# Patient Record
Sex: MALE | Race: WHITE | Employment: PART TIME | URBAN - NONMETROPOLITAN AREA
[De-identification: names, ages, dates, MRNs, and addresses within clinical notes are randomized per-mention and may not be internally consistent; named-entity substitution may affect disease eponyms.]

---

## 2019-12-10 ENCOUNTER — HOSPITAL ENCOUNTER (OUTPATIENT)
Age: 74
Discharge: HOME | End: 2019-12-10
Payer: MEDICARE

## 2019-12-10 ENCOUNTER — HOSPITAL ENCOUNTER (OUTPATIENT)
Dept: GENERAL RADIOLOGY | Age: 74
Discharge: HOME | End: 2019-12-10
Payer: MEDICARE

## 2019-12-10 DIAGNOSIS — M54.50 LOW BACK PAIN, UNSPECIFIED BACK PAIN LATERALITY, UNSPECIFIED CHRONICITY, UNSPECIFIED WHETHER SCIATICA PRESENT: ICD-10-CM

## 2019-12-10 PROCEDURE — 72100 X-RAY EXAM L-S SPINE 2/3 VWS: CPT

## 2020-01-07 ENCOUNTER — HOSPITAL ENCOUNTER (OUTPATIENT)
Dept: MRI IMAGING | Age: 75
Discharge: HOME | End: 2020-01-07
Payer: MEDICARE

## 2020-01-07 PROCEDURE — 72148 MRI LUMBAR SPINE W/O DYE: CPT

## 2022-05-23 ENCOUNTER — TELEPHONE (OUTPATIENT)
Dept: ENT CLINIC | Age: 77
End: 2022-05-23

## 2022-05-23 NOTE — TELEPHONE ENCOUNTER
Called and left a voicemail to schedule a consultation for the patient. We received a referral from the [place]. I have yet to enter the referral due to the referral team.   Asked for a call back so we can schedule together.

## 2022-06-23 ENCOUNTER — OFFICE VISIT (OUTPATIENT)
Dept: ENT CLINIC | Age: 77
End: 2022-06-23
Payer: OTHER GOVERNMENT

## 2022-06-23 VITALS
WEIGHT: 179.9 LBS | HEIGHT: 71 IN | TEMPERATURE: 97.5 F | SYSTOLIC BLOOD PRESSURE: 128 MMHG | OXYGEN SATURATION: 98 % | DIASTOLIC BLOOD PRESSURE: 70 MMHG | BODY MASS INDEX: 25.19 KG/M2 | HEART RATE: 61 BPM

## 2022-06-23 DIAGNOSIS — R04.0 EPISTAXIS: ICD-10-CM

## 2022-06-23 DIAGNOSIS — T88.7XXA SIDE EFFECT OF MEDICATION: ICD-10-CM

## 2022-06-23 DIAGNOSIS — J34.2 DEVIATED NASAL SEPTUM: Primary | ICD-10-CM

## 2022-06-23 DIAGNOSIS — R09.81 STUFFY NOSE: ICD-10-CM

## 2022-06-23 PROCEDURE — 99204 OFFICE O/P NEW MOD 45 MIN: CPT | Performed by: OTOLARYNGOLOGY

## 2022-06-23 PROCEDURE — 1123F ACP DISCUSS/DSCN MKR DOCD: CPT | Performed by: OTOLARYNGOLOGY

## 2022-06-23 RX ORDER — IPRATROPIUM BROMIDE 42 UG/1
1 SPRAY, METERED NASAL 3 TIMES DAILY
Qty: 15 ML | Refills: 3 | Status: SHIPPED | OUTPATIENT
Start: 2022-06-23

## 2022-06-23 RX ORDER — RAMIPRIL 2.5 MG/1
2.5 CAPSULE ORAL DAILY
COMMUNITY

## 2022-06-23 ASSESSMENT — ENCOUNTER SYMPTOMS
SINUS PRESSURE: 0
CHEST TIGHTNESS: 0
COLOR CHANGE: 0
COUGH: 0
VOICE CHANGE: 0
DIARRHEA: 0
SORE THROAT: 0
SHORTNESS OF BREATH: 0
FACIAL SWELLING: 0
TROUBLE SWALLOWING: 0
APNEA: 0
STRIDOR: 0
CHOKING: 0
RHINORRHEA: 1
ABDOMINAL PAIN: 0
NAUSEA: 0
VOMITING: 0
WHEEZING: 0

## 2022-06-23 NOTE — PROGRESS NOTES
[facility] [place] [facility]  [facility] EAR, NOSE AND THROAT  [facility]  Dept: [phone]  Dept Fax: [phone]  Loc: [address] [facility] KATHERINE Case is a 68 y.o. male who was referred byNo ref. provider found for:  Chief Complaint   Patient presents with    New Patient     New patient is here Epistaxis and sinus problems. He has had nose broke a few times and reset 1. He said the the drainage is clear. His lost nosebleedwas about a month ago.  Epistaxis   . HPI:     Marivel Pires Case is a 68 y.o. male who presents today for evaluation of epistaxis and sinus problems. Last nosebleed was a month ago. Broke nose a few times and reset it once. States drainage is clear. Bleeding from right side, nose broke several times, had reset once. 15 years ago a doctor in [facility] put a balloon sinu-plasty it was fine after then gradually he started getting bothered with rouble Breathing at night, uses breathe right nasal strips. Takes tamasolosin which can make nose stuffy. Last nosebleed was a month ago with a head cold, nosebleeds when has head colds had 3 nosebleeds in the last year. Takes Afrin only when he gets a head cold, doesn't use daily. Doesn't use Vicks, no menthol, no cough drops. No throat issues. No Q-tip use, no ear buds. Saw Dr. Ivan Thornton for his Urologist, he is established with a new doctor at [facility].      History:     No Known Allergies  Current Outpatient Medications   Medication Sig Dispense Refill    ramipril (ALTACE) 2.5 MG capsule Take 2.5 mg by mouth daily      OXYBUTYNIN TD Place 2.5 mg of ampicillin onto the skin Once a day      ipratropium (ATROVENT) 0.06 % nasal spray 1 spray by Nasal route 3 times daily Spray at bedtime and may repeat every 6 hours. No more than 3 doses a day.  15 mL 3    Probiotic Product (PROBIOTIC DAILY PO) Take 1 capsule by mouth daily      cetirizine (ZYRTEC ALLERGY) 10 MG tablet Take 10 mg by mouth daily      acetaminophen (TYLENOL) 500 MG tablet Take 500 mg by mouth every 6 hours as needed for Pain      tamsulosin (FLOMAX) 0.4 MG capsule Take 0.4 mg by mouth daily      Glucosamine-Fish Oil-EPA-DHA (GLUCOSAMINE & FISH OIL PO) Take by mouth       No current facility-administered medications for this visit. Past Medical History:   Diagnosis Date    BPH (benign prostatic hyperplasia)     OA (osteoarthritis) of hip     left    Prostate cancer ([facility] [address] 2003    Dr. [facility]ousa [facility] - s/p radiation beads/radiation/hormone therapy    Sinus bradycardia     HR 49 on preop EKG - no syncope or lightheadedness - he exercises regularly    Snoring     no known apnea, no daytime somnolence, no am headache, no waking coughing or choking, BMI 24.5, neck circ 15.5 inches      Past Surgical History:   Procedure Laterality Date    PROSTATE SURGERY  6-2003    Prostate CA seed implants    ROTATOR CUFF REPAIR Right     SEPTOPLASTY      SINUS SURGERY  1980    TONSILLECTOMY  1975     Family History   Problem Relation Age of Onset    Breast Cancer Mother     Heart Attack Father 80     Social History     Tobacco Use    Smoking status: Never Smoker    Smokeless tobacco: Former User   Substance Use Topics    Alcohol use: Yes     Comment: Socially       Subjective:       Review of Systems   Constitutional: Negative for activity change, appetite change, chills, diaphoresis, fatigue, fever and unexpected weight change. HENT: Positive for congestion, dental problem, postnasal drip, rhinorrhea and sneezing. Negative for ear discharge, ear pain, facial swelling, hearing loss, mouth sores, nosebleeds, sinus pressure, sore throat, tinnitus, trouble swallowing and voice change. Eyes: Negative for visual disturbance. Respiratory: Negative for apnea, cough, choking, chest tightness, shortness of breath, wheezing and stridor.     Cardiovascular: Negative for chest pain, palpitations and leg swelling. Gastrointestinal: Negative for abdominal pain, diarrhea, nausea and vomiting. Endocrine: Negative for cold intolerance, heat intolerance, polydipsia and polyuria. Genitourinary: Negative for dysuria, enuresis and hematuria. Musculoskeletal: Negative for arthralgias, gait problem, neck pain and neck stiffness. Skin: Negative for color change and rash. Allergic/Immunologic: Negative for environmental allergies, food allergies and immunocompromised state. Neurological: Negative for dizziness, syncope, facial asymmetry, speech difficulty, light-headedness and headaches. Hematological: Negative for adenopathy. Bruises/bleeds easily. Psychiatric/Behavioral: Negative for confusion and sleep disturbance. The patient is not nervous/anxious. Objective:     /70 (Site: Right Upper Arm, Position: Sitting)   Pulse 61   Temp 97.5 °F (36.4 °C) (Infrared)   Ht 5' 11\" (1.803 m)   Wt 179 lb 14.4 oz (81.6 kg)   SpO2 98%   BMI 25.09 kg/m²     Physical Exam  Vitals and nursing note reviewed. Constitutional:       Appearance: He is well-developed. HENT:      Head: Normocephalic and atraumatic. No laceration. Salivary Glands: Right salivary gland is not diffusely enlarged or tender. Left salivary gland is not diffusely enlarged or tender. Comments:        Right Ear: Hearing, tympanic membrane, ear canal and external ear normal. No drainage or swelling. No middle ear effusion. Tympanic membrane is not perforated or erythematous. Left Ear: Hearing, tympanic membrane, ear canal and external ear normal. No drainage or swelling. No middle ear effusion. Tympanic membrane is not perforated or erythematous. Nose: Septal deviation (deviated nasal septum, bows to right up high, mucosa little erythematous) present. No mucosal edema or rhinorrhea. Mouth/Throat:      Mouth: Mucous membranes are moist. Mucous membranes are not pale and not dry. No oral lesions.       Tongue: No lesions. Pharynx: Oropharynx is clear. Uvula midline. No oropharyngeal exudate or posterior oropharyngeal erythema. Comments: LIps: lips normal     Mallampati 1  Base of tongue: symmetric  Mirror exam deferred due to gag reflex. Eyes:      Extraocular Movements: Extraocular movements intact. Comments: Conjugate gaze   Neck:      Thyroid: No thyroid mass or thyromegaly. Trachea: Phonation normal. No tracheal deviation. Comments:     Pulmonary:      Effort: Pulmonary effort is normal. No retractions. Breath sounds: No stridor. Musculoskeletal:      Cervical back: Normal range of motion and neck supple. Lymphadenopathy:      Cervical: No cervical adenopathy. Neurological:      Mental Status: He is alert and oriented to person, place, and time. Cranial Nerves: Cranial nerves are intact. Cranial nerve deficit: VIIth N function intact bilat. Psychiatric:         Mood and Affect: Mood and affect normal.         Behavior: Behavior is cooperative. Data:  All of the past medical history, past surgical history, family history,social history, allergies and current medications were reviewed with the patient. Assessment & Plan   Diagnoses and all orders for this visit:     Diagnosis Orders   1. Stuffy nose  ipratropium (ATROVENT) 0.06 % nasal spray   2. Epistaxis     3. Deviated nasal septum     4. Side effect of medication  ipratropium (ATROVENT) 0.06 % nasal spray    (tamsulosin)       The findings were explained and his questions were answered. Chief complaint is stuffy nose especially at night and he takes tamsulosin, which causes turbinates to swell. Options were discussed including prescribing Atrovent. 1 month follow up, discuss with Urologist regarding best time of day to take Tamsulosin. He agreed. Follow up 1 month       Tiago HOLM CMA ([facility]), am scribing for, and in the presence of Dr. Mendel Antony.  Electronically signed by Rajinder Wong ([facility]) on 6/23/22 at 10:45 AM EDT. (Please note that portions of this note were completed with a voice recognition program. Efforts were made to edit the dictations butoccasionally words are mis-transcribed.)    I agree to the above documentation placed by my scribe. I have personally evaluated this patient. Additional findings are as noted. I reviewed the scribe's note and agree with the documented findings and plan of care. Any areas of disagreement are corrected. I agree with the chief complaint, past medical history, past surgical history, allergies, medications, social and family history as documented unless otherwise noted below.      Electronically signed by Evie Chandra MD on 7/4/2022 at 2:09 PM

## 2022-06-23 NOTE — PATIENT INSTRUCTIONS
Discuss with Urologist regarding what time of day to take Tamsulosin. Will prescribe Atrovent. Take the Atrovent at bedtime on the right side or both sides. May repeat through the night if needed.   Might have to change the timing of your tamsulosin dose

## 2022-07-21 ENCOUNTER — PREP FOR PROCEDURE (OUTPATIENT)
Dept: ENT CLINIC | Age: 77
End: 2022-07-21

## 2022-07-21 ENCOUNTER — OFFICE VISIT (OUTPATIENT)
Dept: ENT CLINIC | Age: 77
End: 2022-07-21
Payer: MEDICARE

## 2022-07-21 VITALS
HEIGHT: 71 IN | HEART RATE: 53 BPM | WEIGHT: 181.1 LBS | RESPIRATION RATE: 14 BRPM | DIASTOLIC BLOOD PRESSURE: 56 MMHG | BODY MASS INDEX: 25.35 KG/M2 | SYSTOLIC BLOOD PRESSURE: 116 MMHG | OXYGEN SATURATION: 97 % | TEMPERATURE: 97.2 F

## 2022-07-21 DIAGNOSIS — R09.81 STUFFY NOSE: ICD-10-CM

## 2022-07-21 DIAGNOSIS — Z01.818 PRE-OP TESTING: Primary | ICD-10-CM

## 2022-07-21 DIAGNOSIS — J34.2 DEVIATED NASAL SEPTUM: Primary | ICD-10-CM

## 2022-07-21 DIAGNOSIS — M95.0: ICD-10-CM

## 2022-07-21 DIAGNOSIS — J34.3 HYPERTROPHY OF BOTH INFERIOR NASAL TURBINATES: ICD-10-CM

## 2022-07-21 DIAGNOSIS — R09.81 CHRONIC NASAL CONGESTION: ICD-10-CM

## 2022-07-21 PROCEDURE — G8420 CALC BMI NORM PARAMETERS: HCPCS | Performed by: OTOLARYNGOLOGY

## 2022-07-21 PROCEDURE — G8427 DOCREV CUR MEDS BY ELIG CLIN: HCPCS | Performed by: OTOLARYNGOLOGY

## 2022-07-21 PROCEDURE — 1036F TOBACCO NON-USER: CPT | Performed by: OTOLARYNGOLOGY

## 2022-07-21 PROCEDURE — 1123F ACP DISCUSS/DSCN MKR DOCD: CPT | Performed by: OTOLARYNGOLOGY

## 2022-07-21 PROCEDURE — 99214 OFFICE O/P EST MOD 30 MIN: CPT | Performed by: OTOLARYNGOLOGY

## 2022-07-21 NOTE — PROGRESS NOTES
[facility] [place] [facility]  [facility] EAR, NOSE AND THROAT  [facility]  Dept: [phone]  Dept Fax: [phone]  Loc: [address] [facility] KATHERINE Case is a 68 y.o. male who was referred byNo ref. provider found for:  Chief Complaint   Patient presents with    Follow-up     Patient is here for 1 mo f/u deviated nasal septum     . HPI:     Kathy Keith Case is a 68 y.o. male who presents today for 1 month follow up deviated nasal septum. States the spray helped. States nose is still restricted. Patient uses the breathe right nasal strips at night. 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 He tried taking tamsulosin in the mornings and now he's going to see if he can take it every third morning. Has a dry mouth when he wakes up. Takes ramipril, fish oil. He's been using a menthol vapor nasal inhaler for 2 weeks. History:     No Known Allergies  Current Outpatient Medications   Medication Sig Dispense Refill    ramipril (ALTACE) 2.5 MG capsule Take 2.5 mg by mouth daily      ipratropium (ATROVENT) 0.06 % nasal spray 1 spray by Nasal route 3 times daily Spray at bedtime and may repeat every 6 hours. No more than 3 doses a day. 15 mL 3    tamsulosin (FLOMAX) 0.4 MG capsule Take 0.4 mg by mouth daily      Probiotic Product (PROBIOTIC DAILY PO) Take 1 capsule by mouth daily      cetirizine (ZYRTEC) 10 MG tablet Take 10 mg by mouth daily      acetaminophen (TYLENOL) 500 MG tablet Take 500 mg by mouth every 6 hours as needed for Pain      Glucosamine-Fish Oil-EPA-DHA (GLUCOSAMINE & FISH OIL PO) Take by mouth      diphenhydrAMINE (BENADRYL) 12.5 MG chewable tablet Take 12.5 mg by mouth nightly as needed for Allergies       No current facility-administered medications for this visit.      Past Medical History:   Diagnosis Date    BPH (benign prostatic hyperplasia)     OA (osteoarthritis) of hip     left    Prostate cancer ([facility] [address] 01/01/2003 Dr. Sousa [facility] - s/p radiation beads/radiation/hormone therapy    Sinus bradycardia     HR 49 on preop EKG - no syncope or lightheadedness - he exercises regularly    Snoring     no known apnea, no daytime somnolence, no am headache, no waking coughing or choking, BMI 24.5, neck circ 15.5 inches      Past Surgical History:   Procedure Laterality Date    KNEE ARTHROSCOPY W/ MENISCAL REPAIR Left 2022    PROSTATE SURGERY  06/01/2003    Prostate CA seed implants    ROTATOR CUFF REPAIR Right 10/01/2012    SEPTOPLASTY  11/01/1977    SINUS SURGERY  01/01/1980    TONSILLECTOMY  01/01/1975     Family History   Problem Relation Age of Onset    Breast Cancer Mother     Heart Attack Father 80     Social History     Tobacco Use    Smoking status: Never    Smokeless tobacco: Former    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 Constitutional:       Appearance: He is well-developed. HENT:      Head: Normocephalic and atraumatic. No laceration. Salivary Glands: Right salivary gland is not diffusely enlarged or tender. Left salivary gland is not diffusely enlarged or tender. Comments:        Right Ear: Hearing, tympanic membrane, ear canal and external ear normal. No drainage or swelling. No middle ear effusion. Tympanic membrane is not perforated or erythematous. Left Ear: Hearing, tympanic membrane, ear canal and external ear normal. No drainage or swelling. No middle ear effusion. Tympanic membrane is not perforated or erythematous.       Nose: Nasal deformity and septal deviation (deviated nasal septum, bows to right up high, mucosa little erythematous) present. No mucosal edema or rhinorrhea. Comments: He has a large nose externally, which droops significantly and contributes to much of his nasal obstruction. Mouth/Throat:      Mouth: Mucous membranes are moist. Mucous membranes are not pale and not dry. No oral lesions. Tongue: No lesions. Pharynx: Oropharynx is clear. Uvula midline. No oropharyngeal exudate or posterior oropharyngeal erythema. Comments: LIps: lips normal     Mallampati 1  Base of tongue: symmetric  Mirror exam deferred due to gag reflex. Eyes:      Extraocular Movements: Extraocular movements intact. Comments: Conjugate gaze   Neck:      Thyroid: No thyroid mass or thyromegaly. Trachea: Phonation normal. No tracheal deviation. Comments:     Cardiovascular:      Rate and Rhythm: Normal rate and regular rhythm. Heart sounds: No murmur heard. Pulmonary:      Effort: Pulmonary effort is normal. No retractions. Breath sounds: Normal breath sounds. No stridor. Chest:      Chest wall: There is no dullness to percussion. Musculoskeletal:      Cervical back: Normal range of motion and neck supple. Lymphadenopathy:      Cervical: No cervical adenopathy. Neurological:      Mental Status: He is alert and oriented to person, place, and time. Cranial Nerves: Cranial nerves are intact. Cranial nerve deficit: VIIth N function intact bilat. Psychiatric:         Mood and Affect: Mood and affect normal.         Behavior: Behavior is cooperative. Data:  All of the past medical history, past surgical history, family history,social history, allergies and current medications were reviewed with the patient. Assessment & Plan   Diagnoses and all orders for this visit:     Diagnosis Orders   1.  Deviated nasal septum  IN REPAIR OF NASAL SEPTUM      2. Stuffy nose  IN REPAIR OF NASAL SEPTUM Miscellaneous Surgery      3. Hypertrophy of both inferior nasal turbinates  Miscellaneous Surgery      4. Chronic nasal congestion  NC REPAIR OF NASAL SEPTUM    Miscellaneous Surgery    Worse at night      5. Overhanging nasal tip  NC REPAIR OF NASAL SEPTUM    Simple elevation of the tip helps nasal airway significantly          The findings were explained and his questions were answered. Opitons were discussed including septoplasty to clear airway and to lift up the tip of his nose. Surgery would be 1 and a half hour to 2 hours estimated. He agreed. Informed consent: Septoplasty complications that may occur were discussed including postoperative bleeding (usually easy to control), infection, nasal crusting, a hole in the septum (perforation), failure to completely improve breathing, persistent septal deflection resulting in the need for revision (uncommon), and very rarely, a change in appearance. They understand that no guarantees are made regarding either outcome or potential complications. They read the patient information sheet and were given a copy to take with them. All of their questions were answered. They requested we proceed. Benefits and risks are discussed, along with alternatives, and their questions were answered. No guarantees were made. They request we proceed. Prescription medications to be held:  Ramipril for 2 days, before surgery and two weeks afterwards. Tamsulosin for a week before and a week after surgery       ICasandra CMA ([facility]), am scribing for, and in the presence of Dr. Isra Pearce. Electronically signed by Arie Winston CMA ([facility]) on 7/21/22 at 10:17 AM EDT. (Please note that portions of this note were completed with a voice recognition program. Efforts were made to edit the dictations butoccasionally words are mis-transcribed.)    I agree to the above documentation placed by my scribe. I have personally evaluated this patient.  Additional findings are as noted. I reviewed the scribe's note and agree with the documented findings and plan of care. Any areas of disagreement are corrected. I agree with the chief complaint, past medical history, past surgical history, allergies, medications, social and family history as documented unless otherwise noted below.      Electronically signed by Luis Ayoub MD on 8/16/2022 at 11:55 PM

## 2022-08-08 ENCOUNTER — HOSPITAL ENCOUNTER (OUTPATIENT)
Dept: GENERAL RADIOLOGY | Age: 77
Discharge: HOME | End: 2022-08-08
Payer: MEDICARE

## 2022-08-08 ENCOUNTER — HOSPITAL ENCOUNTER (OUTPATIENT)
Age: 77
Discharge: HOME | End: 2022-08-08
Payer: MEDICARE

## 2022-08-08 DIAGNOSIS — Z01.818 PRE-OP TESTING: ICD-10-CM

## 2022-08-08 LAB
EKG ATRIAL RATE: 51 BPM
EKG P AXIS: 107 DEGREES
EKG P-R INTERVAL: 130 MS
EKG Q-T INTERVAL: 446 MS
EKG QRS DURATION: 90 MS
EKG QTC CALCULATION (BAZETT): 411 MS
EKG R AXIS: 154 DEGREES
EKG T AXIS: 129 DEGREES
EKG VENTRICULAR RATE: 51 BPM

## 2022-08-08 PROCEDURE — 71046 X-RAY EXAM CHEST 2 VIEWS: CPT

## 2022-08-10 ENCOUNTER — PREP FOR PROCEDURE (OUTPATIENT)
Dept: ENT CLINIC | Age: 77
End: 2022-08-10

## 2022-08-10 DIAGNOSIS — J34.2 DEVIATED NASAL SEPTUM: Primary | ICD-10-CM

## 2022-08-10 DIAGNOSIS — J34.3 HYPERTROPHY OF BOTH INFERIOR NASAL TURBINATES: ICD-10-CM

## 2022-08-10 DIAGNOSIS — M95.0: ICD-10-CM

## 2022-08-11 RX ORDER — DIPHENHYDRAMINE HYDROCHLORIDE 12.5 MG/1
12.5 BAR, CHEWABLE ORAL NIGHTLY PRN
COMMUNITY

## 2022-08-11 NOTE — PROGRESS NOTES
Follow all instructions given by your physician    NPO after midnight   Sips of water am of surgery with allowed medications  Bring insurance info and 's license  Wear comfortable clean, loose fitting clothing  No jewelry or contact lenses to be worn day of surgery  Case for glasses. Shower night before and morning of surgery with a liquid antibacterial soap, dry with fresh clean towel; no lotions, creams or powder. Clean sheets and pillow case on bed night before surgery  Bring medications in original bottles     needed at discharge and someone over 18 to stay with you for 24 hours overnight (surgery may be cancelled if you don't have this)    Report [facility] on 2nd floor  If you would become ill prior to surgery, please call the surgeon  May have a visitor with you, we request that you limit to 2 visitors in pre-op area  Please bring and wear mask    Call [phone] for any questions  Covid questionnaire Complete; Patient negative for symptoms or exposure. See documentation.

## 2022-08-17 ENCOUNTER — ANESTHESIA (OUTPATIENT)
Dept: OPERATING ROOM | Age: 77
End: 2022-08-17
Payer: OTHER GOVERNMENT

## 2022-08-17 ENCOUNTER — HOSPITAL ENCOUNTER (OUTPATIENT)
Age: 77
Setting detail: OUTPATIENT SURGERY
Discharge: HOME | End: 2022-08-17
Attending: OTOLARYNGOLOGY | Admitting: OTOLARYNGOLOGY
Payer: OTHER GOVERNMENT

## 2022-08-17 ENCOUNTER — ANESTHESIA EVENT (OUTPATIENT)
Dept: OPERATING ROOM | Age: 77
End: 2022-08-17
Payer: OTHER GOVERNMENT

## 2022-08-17 VITALS
DIASTOLIC BLOOD PRESSURE: 68 MMHG | SYSTOLIC BLOOD PRESSURE: 157 MMHG | BODY MASS INDEX: 24.92 KG/M2 | OXYGEN SATURATION: 94 % | TEMPERATURE: 96.3 F | HEART RATE: 71 BPM | HEIGHT: 71 IN | RESPIRATION RATE: 16 BRPM | WEIGHT: 178 LBS

## 2022-08-17 DIAGNOSIS — J34.2 DEVIATED NASAL SEPTUM: ICD-10-CM

## 2022-08-17 DIAGNOSIS — J34.3 HYPERTROPHY OF BOTH INFERIOR NASAL TURBINATES: Primary | ICD-10-CM

## 2022-08-17 LAB — POTASSIUM REFLEX MAGNESIUM: 4.3 MEQ/L (ref 3.5–5.2)

## 2022-08-17 PROCEDURE — 2709999900 HC NON-CHARGEABLE SUPPLY: Performed by: OTOLARYNGOLOGY

## 2022-08-17 PROCEDURE — 6360000002 HC RX W HCPCS: Performed by: STUDENT IN AN ORGANIZED HEALTH CARE EDUCATION/TRAINING PROGRAM

## 2022-08-17 PROCEDURE — 7100000000 HC PACU RECOVERY - FIRST 15 MIN: Performed by: OTOLARYNGOLOGY

## 2022-08-17 PROCEDURE — 3700000001 HC ADD 15 MINUTES (ANESTHESIA): Performed by: OTOLARYNGOLOGY

## 2022-08-17 PROCEDURE — 3600000004 HC SURGERY LEVEL 4 BASE: Performed by: OTOLARYNGOLOGY

## 2022-08-17 PROCEDURE — 6360000002 HC RX W HCPCS: Performed by: NURSE ANESTHETIST, CERTIFIED REGISTERED

## 2022-08-17 PROCEDURE — 6360000002 HC RX W HCPCS: Performed by: OTOLARYNGOLOGY

## 2022-08-17 PROCEDURE — 7100000011 HC PHASE II RECOVERY - ADDTL 15 MIN: Performed by: OTOLARYNGOLOGY

## 2022-08-17 PROCEDURE — 2500000003 HC RX 250 WO HCPCS: Performed by: OTOLARYNGOLOGY

## 2022-08-17 PROCEDURE — 3700000000 HC ANESTHESIA ATTENDED CARE: Performed by: OTOLARYNGOLOGY

## 2022-08-17 PROCEDURE — 30520 REPAIR OF NASAL SEPTUM: CPT | Performed by: OTOLARYNGOLOGY

## 2022-08-17 PROCEDURE — 2500000003 HC RX 250 WO HCPCS: Performed by: NURSE ANESTHETIST, CERTIFIED REGISTERED

## 2022-08-17 PROCEDURE — 2580000003 HC RX 258: Performed by: STUDENT IN AN ORGANIZED HEALTH CARE EDUCATION/TRAINING PROGRAM

## 2022-08-17 PROCEDURE — 84132 ASSAY OF SERUM POTASSIUM: CPT

## 2022-08-17 PROCEDURE — 30802 ABLATE INF TURBINATE SUBMUC: CPT | Performed by: OTOLARYNGOLOGY

## 2022-08-17 PROCEDURE — 7100000001 HC PACU RECOVERY - ADDTL 15 MIN: Performed by: OTOLARYNGOLOGY

## 2022-08-17 PROCEDURE — 2580000003 HC RX 258: Performed by: OTOLARYNGOLOGY

## 2022-08-17 PROCEDURE — 7100000010 HC PHASE II RECOVERY - FIRST 15 MIN: Performed by: OTOLARYNGOLOGY

## 2022-08-17 PROCEDURE — 2720000010 HC SURG SUPPLY STERILE: Performed by: OTOLARYNGOLOGY

## 2022-08-17 PROCEDURE — 36415 COLL VENOUS BLD VENIPUNCTURE: CPT

## 2022-08-17 PROCEDURE — 2500000003 HC RX 250 WO HCPCS: Performed by: STUDENT IN AN ORGANIZED HEALTH CARE EDUCATION/TRAINING PROGRAM

## 2022-08-17 PROCEDURE — 6370000000 HC RX 637 (ALT 250 FOR IP): Performed by: OTOLARYNGOLOGY

## 2022-08-17 PROCEDURE — 3600000014 HC SURGERY LEVEL 4 ADDTL 15MIN: Performed by: OTOLARYNGOLOGY

## 2022-08-17 RX ORDER — FAMOTIDINE 20 MG/1
20 TABLET, FILM COATED ORAL 2 TIMES DAILY
Qty: 60 TABLET | Refills: 0 | Status: SHIPPED | OUTPATIENT
Start: 2022-08-17

## 2022-08-17 RX ORDER — CLINDAMYCIN HYDROCHLORIDE 300 MG/1
300 CAPSULE ORAL 3 TIMES DAILY
Qty: 42 CAPSULE | Refills: 1 | Status: SHIPPED | OUTPATIENT
Start: 2022-08-17 | End: 2022-08-31

## 2022-08-17 RX ORDER — SODIUM CHLORIDE 9 MG/ML
INJECTION, SOLUTION INTRAVENOUS CONTINUOUS PRN
Status: DISCONTINUED | OUTPATIENT
Start: 2022-08-17 | End: 2022-08-17 | Stop reason: SDUPTHER

## 2022-08-17 RX ORDER — EPINEPHRINE 1 MG/ML
INJECTION, SOLUTION, CONCENTRATE INTRAVENOUS PRN
Status: DISCONTINUED | OUTPATIENT
Start: 2022-08-17 | End: 2022-08-17 | Stop reason: ALTCHOICE

## 2022-08-17 RX ORDER — SODIUM CHLORIDE 0.9 % (FLUSH) 0.9 %
5-40 SYRINGE (ML) INJECTION PRN
Status: DISCONTINUED | OUTPATIENT
Start: 2022-08-17 | End: 2022-08-17 | Stop reason: HOSPADM

## 2022-08-17 RX ORDER — LIDOCAINE HYDROCHLORIDE 20 MG/ML
INJECTION, SOLUTION EPIDURAL; INFILTRATION; INTRACAUDAL; PERINEURAL PRN
Status: DISCONTINUED | OUTPATIENT
Start: 2022-08-17 | End: 2022-08-17 | Stop reason: SDUPTHER

## 2022-08-17 RX ORDER — EPHEDRINE SULFATE/0.9% NACL/PF 50 MG/5 ML
SYRINGE (ML) INTRAVENOUS PRN
Status: DISCONTINUED | OUTPATIENT
Start: 2022-08-17 | End: 2022-08-17 | Stop reason: SDUPTHER

## 2022-08-17 RX ORDER — SODIUM CHLORIDE 0.9 % (FLUSH) 0.9 %
5-40 SYRINGE (ML) INJECTION PRN
Status: CANCELLED | OUTPATIENT
Start: 2022-08-17

## 2022-08-17 RX ORDER — SODIUM CHLORIDE 0.9 % (FLUSH) 0.9 %
5-40 SYRINGE (ML) INJECTION EVERY 12 HOURS SCHEDULED
Status: DISCONTINUED | OUTPATIENT
Start: 2022-08-17 | End: 2022-08-17 | Stop reason: HOSPADM

## 2022-08-17 RX ORDER — ROPIVACAINE HYDROCHLORIDE 5 MG/ML
INJECTION, SOLUTION EPIDURAL; INFILTRATION; PERINEURAL PRN
Status: DISCONTINUED | OUTPATIENT
Start: 2022-08-17 | End: 2022-08-17 | Stop reason: ALTCHOICE

## 2022-08-17 RX ORDER — SODIUM CHLORIDE 9 MG/ML
INJECTION, SOLUTION INTRAVENOUS PRN
Status: DISCONTINUED | OUTPATIENT
Start: 2022-08-17 | End: 2022-08-17 | Stop reason: HOSPADM

## 2022-08-17 RX ORDER — HYDROCODONE BITARTRATE AND ACETAMINOPHEN 7.5; 325 MG/1; MG/1
1 TABLET ORAL EVERY 6 HOURS PRN
Qty: 20 TABLET | Refills: 0 | Status: SHIPPED | OUTPATIENT
Start: 2022-08-17 | End: 2022-08-22

## 2022-08-17 RX ORDER — SODIUM CHLORIDE 0.9 % (FLUSH) 0.9 %
5-40 SYRINGE (ML) INJECTION EVERY 12 HOURS SCHEDULED
Status: CANCELLED | OUTPATIENT
Start: 2022-08-17

## 2022-08-17 RX ORDER — CLINDAMYCIN PHOSPHATE 900 MG/50ML
900 INJECTION INTRAVENOUS ONCE
Status: COMPLETED | OUTPATIENT
Start: 2022-08-17 | End: 2022-08-17

## 2022-08-17 RX ORDER — PROPOFOL 10 MG/ML
INJECTION, EMULSION INTRAVENOUS PRN
Status: DISCONTINUED | OUTPATIENT
Start: 2022-08-17 | End: 2022-08-17 | Stop reason: SDUPTHER

## 2022-08-17 RX ORDER — GINSENG 100 MG
CAPSULE ORAL PRN
Status: DISCONTINUED | OUTPATIENT
Start: 2022-08-17 | End: 2022-08-17 | Stop reason: ALTCHOICE

## 2022-08-17 RX ORDER — SUCCINYLCHOLINE/SOD CL,ISO/PF 200MG/10ML
SYRINGE (ML) INTRAVENOUS PRN
Status: DISCONTINUED | OUTPATIENT
Start: 2022-08-17 | End: 2022-08-17 | Stop reason: SDUPTHER

## 2022-08-17 RX ORDER — FENTANYL CITRATE 50 UG/ML
INJECTION, SOLUTION INTRAMUSCULAR; INTRAVENOUS PRN
Status: DISCONTINUED | OUTPATIENT
Start: 2022-08-17 | End: 2022-08-17 | Stop reason: SDUPTHER

## 2022-08-17 RX ORDER — ROCURONIUM BROMIDE 10 MG/ML
INJECTION, SOLUTION INTRAVENOUS PRN
Status: DISCONTINUED | OUTPATIENT
Start: 2022-08-17 | End: 2022-08-17 | Stop reason: SDUPTHER

## 2022-08-17 RX ORDER — OXYMETAZOLINE HYDROCHLORIDE 0.05 G/100ML
SPRAY NASAL PRN
Status: DISCONTINUED | OUTPATIENT
Start: 2022-08-17 | End: 2022-08-17 | Stop reason: ALTCHOICE

## 2022-08-17 RX ORDER — DEXAMETHASONE SODIUM PHOSPHATE 4 MG/ML
10 INJECTION, SOLUTION INTRA-ARTICULAR; INTRALESIONAL; INTRAMUSCULAR; INTRAVENOUS; SOFT TISSUE ONCE
Status: COMPLETED | OUTPATIENT
Start: 2022-08-17 | End: 2022-08-17

## 2022-08-17 RX ORDER — LIDOCAINE HYDROCHLORIDE AND EPINEPHRINE 10; 10 MG/ML; UG/ML
INJECTION, SOLUTION INFILTRATION; PERINEURAL PRN
Status: DISCONTINUED | OUTPATIENT
Start: 2022-08-17 | End: 2022-08-17 | Stop reason: ALTCHOICE

## 2022-08-17 RX ORDER — DEXAMETHASONE SODIUM PHOSPHATE 4 MG/ML
INJECTION, SOLUTION INTRA-ARTICULAR; INTRALESIONAL; INTRAMUSCULAR; INTRAVENOUS; SOFT TISSUE PRN
Status: DISCONTINUED | OUTPATIENT
Start: 2022-08-17 | End: 2022-08-17 | Stop reason: ALTCHOICE

## 2022-08-17 RX ORDER — SODIUM CHLORIDE 9 MG/ML
INJECTION, SOLUTION INTRAVENOUS PRN
Status: CANCELLED | OUTPATIENT
Start: 2022-08-17

## 2022-08-17 RX ORDER — ONDANSETRON 2 MG/ML
INJECTION INTRAMUSCULAR; INTRAVENOUS PRN
Status: DISCONTINUED | OUTPATIENT
Start: 2022-08-17 | End: 2022-08-17 | Stop reason: SDUPTHER

## 2022-08-17 RX ORDER — PREDNISONE 20 MG/1
20 TABLET ORAL DAILY
Qty: 4 TABLET | Refills: 0 | Status: SHIPPED | OUTPATIENT
Start: 2022-08-17 | End: 2022-08-20

## 2022-08-17 RX ADMIN — Medication 10 MG: at 15:06

## 2022-08-17 RX ADMIN — Medication 10 MG: at 14:54

## 2022-08-17 RX ADMIN — FENTANYL CITRATE 50 MCG: 50 INJECTION, SOLUTION INTRAMUSCULAR; INTRAVENOUS at 13:38

## 2022-08-17 RX ADMIN — Medication 10 MG: at 13:20

## 2022-08-17 RX ADMIN — ROCURONIUM BROMIDE 10 MG: 10 INJECTION, SOLUTION INTRAVENOUS at 14:54

## 2022-08-17 RX ADMIN — FENTANYL CITRATE 50 MCG: 50 INJECTION, SOLUTION INTRAMUSCULAR; INTRAVENOUS at 12:57

## 2022-08-17 RX ADMIN — PROPOFOL 150 MG: 10 INJECTION, EMULSION INTRAVENOUS at 12:57

## 2022-08-17 RX ADMIN — Medication 120 MG: at 12:58

## 2022-08-17 RX ADMIN — ROCURONIUM BROMIDE 15 MG: 10 INJECTION, SOLUTION INTRAVENOUS at 14:08

## 2022-08-17 RX ADMIN — ROCURONIUM BROMIDE 25 MG: 10 INJECTION, SOLUTION INTRAVENOUS at 13:02

## 2022-08-17 RX ADMIN — ROCURONIUM BROMIDE 10 MG: 10 INJECTION, SOLUTION INTRAVENOUS at 13:39

## 2022-08-17 RX ADMIN — SODIUM CHLORIDE 100 ML/HR: 9 INJECTION, SOLUTION INTRAVENOUS at 10:01

## 2022-08-17 RX ADMIN — CLINDAMYCIN PHOSPHATE 900 MG: 900 INJECTION, SOLUTION INTRAVENOUS at 13:07

## 2022-08-17 RX ADMIN — DEXAMETHASONE SODIUM PHOSPHATE 10 MG: 4 INJECTION, SOLUTION INTRA-ARTICULAR; INTRALESIONAL; INTRAMUSCULAR; INTRAVENOUS; SOFT TISSUE at 12:41

## 2022-08-17 RX ADMIN — SUGAMMADEX 200 MG: 100 INJECTION, SOLUTION INTRAVENOUS at 15:23

## 2022-08-17 RX ADMIN — Medication 10 MG: at 13:58

## 2022-08-17 RX ADMIN — Medication 10 MG: at 14:02

## 2022-08-17 RX ADMIN — ONDANSETRON 4 MG: 2 INJECTION INTRAMUSCULAR; INTRAVENOUS at 15:22

## 2022-08-17 RX ADMIN — ROCURONIUM BROMIDE 5 MG: 10 INJECTION, SOLUTION INTRAVENOUS at 12:57

## 2022-08-17 RX ADMIN — ROCURONIUM BROMIDE 10 MG: 10 INJECTION, SOLUTION INTRAVENOUS at 13:21

## 2022-08-17 RX ADMIN — SODIUM CHLORIDE: 9 INJECTION, SOLUTION INTRAVENOUS at 12:50

## 2022-08-17 RX ADMIN — Medication 100 MG: at 12:57

## 2022-08-17 ASSESSMENT — PAIN SCALES - GENERAL
PAINLEVEL_OUTOF10: 0

## 2022-08-17 ASSESSMENT — PAIN - FUNCTIONAL ASSESSMENT: PAIN_FUNCTIONAL_ASSESSMENT: NONE - DENIES PAIN

## 2022-08-17 NOTE — ANESTHESIA POSTPROCEDURE EVALUATION
Department of Anesthesiology  Postprocedure Note    Patient: Meka Centeno Case  MRN: [id number]  YOB: 1945  Date of evaluation: 8/17/2022      Procedure Summary     Date: 08/17/22 Room / Location: [address] JU[facility] [place]    Anesthesia Start: 1250 Anesthesia Stop: 0945    Procedure: Septoplasty Submucosal Ablation of Bilateral Inferior Nasal Turbinates (Bilateral: Nose) Diagnosis:       Deviated nasal septum      Stuffy nose      Chronic nasal congestion      Overhanging nasal tip      Hypertrophy of inferior nasal turbinate      (Deviated nasal septum [J34.2])      (Stuffy nose [R09.81])      (Chronic nasal congestion [R09.81])      (Overhanging nasal tip [M95.0])      (Hypertrophy of inferior nasal turbinate [J34.3])    Surgeons: Alyssa Penn MD Responsible Provider: Francisco Patten DO    Anesthesia Type: general ASA Status: 2          Anesthesia Type: No value filed.     Nany Phase I: Nany Score: 9    Nany Phase II:        Anesthesia Post Evaluation    Patient location during evaluation: PACU  Patient participation: complete - patient participated  Level of consciousness: awake and alert  Airway patency: patent  Nausea & Vomiting: no vomiting and no nausea  Complications: no  Cardiovascular status: hemodynamically stable  Respiratory status: acceptable  Hydration status: stable

## 2022-08-17 NOTE — PROGRESS NOTES
Pt admitted to [facility] room 10 and oriented to unit. SCD sleeves applied. Pt verbalized permission for first name, last initial and physicians name on white board. SDS board and discharge criteria explained, pt and family verbalized understanding. Pt denies thoughts of harming self or others. Call light in reach. Family at the bedside.

## 2022-08-17 NOTE — ANESTHESIA PRE PROCEDURE
Department of Anesthesiology  Preprocedure Note       Name:  Meka Centeno Case   Age:  68 y.o.  :  1945                                          MRN:  [id number]         Date:  2022      Surgeon: Elbert Freitas):  Alyssa Penn MD    Procedure: Procedure(s):  Septoplasty Submucosal Ablation of Bilateral Inferior Nasal Turbinates    Medications prior to admission:   Prior to Admission medications    Medication Sig Start Date End Date Taking? Authorizing Provider   diphenhydrAMINE (BENADRYL) 12.5 MG chewable tablet Take 12.5 mg by mouth nightly as needed for Allergies   Yes Historical Provider, MD   ramipril (ALTACE) 2.5 MG capsule Take 2.5 mg by mouth daily    Historical Provider, MD   ipratropium (ATROVENT) 0.06 % nasal spray 1 spray by Nasal route 3 times daily Spray at bedtime and may repeat every 6 hours. No more than 3 doses a day.  22   Alyssa Penn MD   tamsulosin (FLOMAX) 0.4 MG capsule Take 0.4 mg by mouth daily    Historical Provider, MD   Probiotic Product (PROBIOTIC DAILY PO) Take 1 capsule by mouth daily    Historical Provider, MD   cetirizine (ZYRTEC) 10 MG tablet Take 10 mg by mouth daily    Historical Provider, MD   acetaminophen (TYLENOL) 500 MG tablet Take 500 mg by mouth every 6 hours as needed for Pain    Historical Provider, MD   Glucosamine-Fish Oil-EPA-DHA (GLUCOSAMINE & FISH OIL PO) Take by mouth    Historical Provider, MD       Current medications:    Current Facility-Administered Medications   Medication Dose Route Frequency Provider Last Rate Last Admin    dexamethasone (DECADRON) injection 10 mg  10 mg IntraVENous Once Alyssa Penn MD        clindamycin (CLEOCIN) 900 mg in dextrose 5 % 50 mL IVPB  900 mg IntraVENous Once Alyssa Penn MD        sodium chloride flush 0.9 % injection 5-40 mL  5-40 mL IntraVENous 2 times per day Alyssa Penn MD        sodium chloride flush 0.9 % injection 5-40 mL  5-40 mL IntraVENous PRN Alyssa Penn MD        0.9 % sodium chloride infusion   IntraVENous PRN Moi Johnson MD           Allergies:  No Known Allergies    Problem List:    Patient Active Problem List   Diagnosis Code    Prostate cancer ([facility] C61    BPH (benign prostatic hyperplasia) N40.0    Sinus bradycardia R00.1    Primary osteoarthritis of left hip M16.12    Deviated nasal septum, left anterior  J34.2    Stuffy nose R09.81    Chronic nasal congestion R09.81    Overhanging nasal tip M95.0    Hypertrophy of both inferior nasal turbinates J34.3       Past Medical History:        Diagnosis Date    BPH (benign prostatic hyperplasia)     OA (osteoarthritis) of hip     left    Prostate cancer ([facility] 01/01/2003    Dr. Meyer Pod - s/p radiation beads/radiation/hormone therapy    Sinus bradycardia     HR 49 on preop EKG - no syncope or lightheadedness - he exercises regularly    Snoring     no known apnea, no daytime somnolence, no am headache, no waking coughing or choking, BMI 24.5, neck circ 15.5 inches       Past Surgical History:        Procedure Laterality Date    KNEE ARTHROSCOPY W/ MENISCAL REPAIR Left 2022    PROSTATE SURGERY  06/01/2003    Prostate CA seed implants    ROTATOR CUFF REPAIR Right 10/01/2012    SEPTOPLASTY  11/01/1977    SINUS SURGERY  01/01/1980    TONSILLECTOMY  01/01/1975       Social History:    Social History     Tobacco Use    Smoking status: Never    Smokeless tobacco: Former    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)   Substance Use Topics    Alcohol use:  Yes     Alcohol/week: 2.0 standard drinks     Types: 2 Cans of beer per week     Comment: Socially                                Counseling given: Not Answered  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)      Vital Signs (Current):   Vitals:    08/11/22 0930 08/17/22 0923   BP:  (!) 165/66   Pulse:  (!) 41   Resp:  18   Temp:  97.7 °F (36.5 °C)   TempSrc:  Temporal   SpO2:  98%   Weight: 178 lb (80.7 kg) 178 lb (80.7 kg)   Height: 5' 11\" (1.803 m) 5' 11\" (1.803 m)                                              BP Readings from Last 3 Encounters:   08/17/22 (!) 165/66   07/21/22 (!) 116/56   06/23/22 128/70       NPO Status: Time of last liquid consumption: 2230                        Time of last solid consumption: 1900                        Date of last liquid consumption: 08/16/22                        Date of last solid food consumption: 08/16/22    BMI:   Wt Readings from Last 3 Encounters:   08/17/22 178 lb (80.7 kg)   07/21/22 181 lb 1.6 oz (82.1 kg)   06/23/22 179 lb 14.4 oz (81.6 kg)     Body mass index is 24.83 kg/m². CBC:   Lab Results   Component Value Date/Time    WBC 6.3 08/08/2022 02:24 PM    RBC 4.82 08/08/2022 02:24 PM    HGB 14.2 08/08/2022 02:24 PM    HCT 44.5 08/08/2022 02:24 PM    MCV 92.3 08/08/2022 02:24 PM    RDW 12.7 06/27/2015 11:03 AM     08/08/2022 02:24 PM       CMP:   Lab Results   Component Value Date/Time     08/08/2022 02:24 PM    K 4.1 08/08/2022 02:24 PM     08/08/2022 02:24 PM    CO2 26 08/08/2022 02:24 PM    BUN 18 08/08/2022 02:24 PM    CREATININE 1.1 08/08/2022 02:24 PM    LABGLOM 65 08/08/2022 02:24 PM    GLUCOSE 119 08/08/2022 02:24 PM    PROT 7.0 08/08/2022 02:24 PM    CALCIUM 10.0 08/08/2022 02:24 PM    BILITOT 0.4 08/08/2022 02:24 PM    ALKPHOS 61 08/08/2022 02:24 PM    AST 23 08/08/2022 02:24 PM    ALT 16 08/08/2022 02:24 PM       POC Tests: No results for input(s): POCGLU, POCNA, POCK, POCCL, POCBUN, POCHEMO, POCHCT in the last 72 hours.     Coags: No results found for: PROTIME, INR, APTT    HCG (If Applicable): No results found for: PREGTESTUR, PREGSERUM, HCG, HCGQUANT     ABGs: No results found for: PHART, PO2ART, TUG8IQP, YUN9OYH, BEART, V8CVXDXQ     Type & Screen (If Applicable):  No results found for: LABABO, LABRH    Drug/Infectious Status (If Applicable):  No results found for: HIV, HEPCAB    COVID-19 Screening (If Applicable): No results found for: COVID19        Anesthesia Evaluation  Patient summary reviewed no history of anesthetic complications:   Airway: Mallampati: II  TM distance: >3 FB   Neck ROM: full  Mouth opening: > = 3 FB   Dental: normal exam         Pulmonary:normal exam        (-) COPD and asthma                           Cardiovascular:Negative CV ROS  Exercise tolerance: good (>4 METS),   (+) dysrhythmias (bradycardia):,     (-) hypertension, past MI and CAD                Neuro/Psych:      (-) seizures and CVA           GI/Hepatic/Renal:        (-) GERD, liver disease and no renal disease       Endo/Other:    (+) : arthritis:., .    (-) diabetes mellitus, hypothyroidism, hyperthyroidism                ROS comment: Prostate cancer Abdominal:             Vascular:     - DVT. Other Findings:           Anesthesia Plan      general     ASA 2       Induction: intravenous. Anesthetic plan and risks discussed with patient (friend). Plan discussed with CRNA.                     Jacques Joyce DO   8/17/2022

## 2022-08-17 NOTE — DISCHARGE INSTRUCTIONS
NASAL SURGERY   POST-OP INSTRUCTION SHEET    1. Keep your scheduled post-operative appointment. 2. Do not blow your nose for 3 days after surgery. You may gently sniff   to clear drainage. No snorting. 3. Expect moderate amounts of bloody discharge for a few hours. Change your dressing   as needed if it becomes soiled. Place it on your upper lip and do not block the nostrils. If you are not having discharge you do not have to wear a dressing. 4. Activity should be gradually increased, but you should not lift over   10-15 lbs., or exercise more strenuously than fast walking for 3 days  following surgery. Straining to stabilize your core is the issue, not how much your arm is holding. You may return to work, with these guidelines in mind, as soon as you feel well enough, and are off narcotic pain medicine, unless work is in a jeniffer/dirty environment. Please follow additional post-op instructions provided by nursing staff upon leaving hospital.  If you have any questions or problems, please contact our office ar [phone]       What symptoms are normal?    Soreness in front of nose and/or upper teeth  Clear/bloody drainage during first 3 days is not unusual  Facial pains/headaches  Sutures inside of nose will either dissolve away or fall off-- May take up to 6 wks after surgery  Nasal stuffiness improves gradually over weeks. Medicines: You will get 4 prescriptions sent directly to your pharmacy     Use Afrin nasal spray, 8 drops each nostril on back with head hanging off edge of bed, stay five minutes, at least every 8 hours and at bedtime for five days. . Today at 6 PM, 10 PM, 6 AM, 12 NOON      Brand-name original Umair Sport is more comfortable. You may use that instead of the generic oxymetazoline from the hospital.    Apply Bacitracin ointment with a Q tip, inside about a half inch all around. Three times a day for 2 weeks. .then every night as needed for 4 weeks    Saline Sinus Rinse:   Twice a Day beginning three days after surgery. Use NeilMed bottle and packets and use only distilled water. If distilled water is not available, boiled tap water is acceptable, but let it cool. May use this more often if desired, as it  helps congestion and pressure, especially if you use two packets per bottle to make it hypertonic    Ignore the pharmacy printout about held meds being resumed. Clotshavon Barnett Cloteal Barnett Hold the Ramipril for two days and start Pepcid tonight twice a day for 30 days. Hold the Tamsulosin for at least 5 days. Dos and Donts    DO cough or sneeze with your mouth open  DO sniff in to clear secretions  DO sleep on a recliner if possible for the first 1-2days  DO use the sinus rinse twice a day in each nostril after three days  DO NOT use fingernails or nozzle of ointment tube in nose  DO NOT pick at crusts just inside the nostril if you have a septoplasty. There are sutures there. May clean gently with peroxide on a Q-tip to remove bloody crusts from the nostrils  DO NOT take aspirin products (aspirin, aleve, ibuprofen, motrin, etc for (2) two weeks following surgery or any of the other platelet inhibitors on the list provided to you. Includes garlic in any form. DO NOT blow your nose for 3 days following surgery. DO NOT try to \"stifle\" a sneeze by holding your nose, rather open mouth and let it out, even holler.   DO NOT participate in strenuous activity for at least 3 days following surgery, seven if a nasal splint is applied to dorsum

## 2022-08-17 NOTE — H&P
[facility] [place] [facility]  [facility]  [facility]  Dept: [phone]  Dept Fax: [phone]  Loc: [address] [facility] Case is a 68 y.o. male who was referred byNo ref. provider found for:  Chief Complaint   Patient presents with    Follow-up     Patient is here for 1 mo f/u deviated nasal septum     . HPI:     Shaji Booker Case is a 68 y.o. male who presents today for 1 month follow up deviated nasal septum. States the spray helped. States nose is still restricted. Patient uses the breathe right nasal strips at night. 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 He tried taking tamsulosin in the mornings and now he's going to see if he can take it every third morning. Has a dry mouth when he wakes up. Takes ramipril, fish oil. He's been using a menthol vapor nasal inhaler for 2 weeks. History:     No Known Allergies  Current Outpatient Medications   Medication Sig Dispense Refill    ramipril (ALTACE) 2.5 MG capsule Take 2.5 mg by mouth daily      ipratropium (ATROVENT) 0.06 % nasal spray 1 spray by Nasal route 3 times daily Spray at bedtime and may repeat every 6 hours. No more than 3 doses a day. 15 mL 3    tamsulosin (FLOMAX) 0.4 MG capsule Take 0.4 mg by mouth daily      Probiotic Product (PROBIOTIC DAILY PO) Take 1 capsule by mouth daily      cetirizine (ZYRTEC) 10 MG tablet Take 10 mg by mouth daily      acetaminophen (TYLENOL) 500 MG tablet Take 500 mg by mouth every 6 hours as needed for Pain      Glucosamine-Fish Oil-EPA-DHA (GLUCOSAMINE & FISH OIL PO) Take by mouth      diphenhydrAMINE (BENADRYL) 12.5 MG chewable tablet Take 12.5 mg by mouth nightly as needed for Allergies       No current facility-administered medications for this visit.      Past Medical History:   Diagnosis Date    BPH (benign prostatic hyperplasia)     OA (osteoarthritis) of hip     left    Prostate cancer ([facility] [facility] 01/01/2003 Dr. Maxwell - s/p radiation beads/radiation/hormone therapy    Sinus bradycardia     HR 49 on preop EKG - no syncope or lightheadedness - he exercises regularly    Snoring     no known apnea, no daytime somnolence, no am headache, no waking coughing or choking, BMI 24.5, neck circ 15.5 inches      Past Surgical History:   Procedure Laterality Date    KNEE ARTHROSCOPY W/ MENISCAL REPAIR Left 2022    PROSTATE SURGERY  06/01/2003    Prostate CA seed implants    ROTATOR CUFF REPAIR Right 10/01/2012    SEPTOPLASTY  11/01/1977    SINUS SURGERY  01/01/1980    TONSILLECTOMY  01/01/1975     Family History   Problem Relation Age of Onset    Breast Cancer Mother     Heart Attack Father 80     Social History     Tobacco Use    Smoking status: Never    Smokeless tobacco: Former    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 Constitutional:       Appearance: He is well-developed. HENT:      Head: Normocephalic and atraumatic. No laceration. Salivary Glands: Right salivary gland is not diffusely enlarged or tender. Left salivary gland is not diffusely enlarged or tender. Comments:        Right Ear: Hearing, tympanic membrane, ear canal and external ear normal. No drainage or swelling. No middle ear effusion. Tympanic membrane is not perforated or erythematous. Left Ear: Hearing, tympanic membrane, ear canal and external ear normal. No drainage or swelling. No middle ear effusion. Tympanic membrane is not perforated or erythematous.       Nose: Nasal deformity and septal deviation (deviated nasal septum, bows to right up high, mucosa little erythematous) present. No mucosal edema or rhinorrhea. Comments: He has a large nose externally, which droops significantly and contributes to much of his nasal obstruction. Mouth/Throat:      Mouth: Mucous membranes are moist. Mucous membranes are not pale and not dry. No oral lesions. Tongue: No lesions. Pharynx: Oropharynx is clear. Uvula midline. No oropharyngeal exudate or posterior oropharyngeal erythema. Comments: LIps: lips normal     Mallampati 1  Base of tongue: symmetric  Mirror exam deferred due to gag reflex. Eyes:      Extraocular Movements: Extraocular movements intact. Comments: Conjugate gaze   Neck:      Thyroid: No thyroid mass or thyromegaly. Trachea: Phonation normal. No tracheal deviation. Comments:     Cardiovascular:      Rate and Rhythm: Normal rate and regular rhythm. Heart sounds: No murmur heard. Pulmonary:      Effort: Pulmonary effort is normal. No retractions. Breath sounds: Normal breath sounds. No stridor. Chest:      Chest wall: There is no dullness to percussion. Musculoskeletal:      Cervical back: Normal range of motion and neck supple. Lymphadenopathy:      Cervical: No cervical adenopathy. Neurological:      Mental Status: He is alert and oriented to person, place, and time. Cranial Nerves: Cranial nerves are intact. Cranial nerve deficit: VIIth N function intact bilat. Psychiatric:         Mood and Affect: Mood and affect normal.         Behavior: Behavior is cooperative. Data:  All of the past medical history, past surgical history, family history,social history, allergies and current medications were reviewed with the patient. Assessment & Plan   Diagnoses and all orders for this visit:     Diagnosis Orders   1.  Deviated nasal septum  [place] REPAIR OF NASAL SEPTUM      2. Stuffy nose  [place] REPAIR OF NASAL SEPTUM Miscellaneous Surgery      3. Hypertrophy of both inferior nasal turbinates  Miscellaneous Surgery      4. Chronic nasal congestion  ND REPAIR OF NASAL SEPTUM    Miscellaneous Surgery    Worse at night      5. Overhanging nasal tip  ND REPAIR OF NASAL SEPTUM    Simple elevation of the tip helps nasal airway significantly          The findings were explained and his questions were answered. Opitons were discussed including septoplasty to clear airway and to lift up the tip of his nose. Surgery would be 1 and a half hour to 2 hours estimated. He agreed. Informed consent: Septoplasty complications that may occur were discussed including postoperative bleeding (usually easy to control), infection, nasal crusting, a hole in the septum (perforation), failure to completely improve breathing, persistent septal deflection resulting in the need for revision (uncommon), and very rarely, a change in appearance. They understand that no guarantees are made regarding either outcome or potential complications. They read the patient information sheet and were given a copy to take with them. All of their questions were answered. They requested we proceed. Benefits and risks are discussed, along with alternatives, and their questions were answered. No guarantees were made. They request we proceed. Prescription medications to be held:  Ramipril for 2 days, before surgery and two weeks afterwards. Tamsulosin for a week before and a week after surgery       IMayuri CMA ([facility]), am scribing for, and in the presence of Dr. Judith Goodell. Electronically signed by Kenna Martell CMA ([facility]) on 7/21/22 at 10:17 AM EDT. (Please note that portions of this note were completed with a voice recognition program. Efforts were made to edit the dictations butoccasionally words are mis-transcribed.)    I agree to the above documentation placed by my scribe. I have personally evaluated this patient.  Additional findings are as noted. I reviewed the scribe's note and agree with the documented findings and plan of care. Any areas of disagreement are corrected. I agree with the chief complaint, past medical history, past surgical history, allergies, medications, social and family history as documented unless otherwise noted below.      Electronically signed by Tramaine Zavala MD on 8/16/2022 at 11:55 PM

## 2022-08-17 NOTE — PROGRESS NOTES
Pt returned to [facility] room 10. Vitals and assessment as charted. 0.9 infusing, @700ml to count from PACU. Pt has sherbet and water. friend at the bedside. Pt and friend verbalized understanding of discharge criteria and call light use. Call light in reach.

## 2022-08-17 NOTE — INTERVAL H&P NOTE
Pt Name: Aguila Wheatley  MRN: [id number]  YOB: 1945  Date of evaluation: 8/17/2022    I have examined the patient and reviewed the H&P/Consult and there are no changes to the patient or plans.          Electronically signed by Ester Petty MD on 8/17/2022 at 12:49 PM

## 2022-08-18 ENCOUNTER — OFFICE VISIT (OUTPATIENT)
Dept: ENT CLINIC | Age: 77
End: 2022-08-18

## 2022-08-18 VITALS
RESPIRATION RATE: 16 BRPM | WEIGHT: 181 LBS | DIASTOLIC BLOOD PRESSURE: 68 MMHG | SYSTOLIC BLOOD PRESSURE: 128 MMHG | HEIGHT: 71 IN | OXYGEN SATURATION: 97 % | BODY MASS INDEX: 25.34 KG/M2 | HEART RATE: 63 BPM | TEMPERATURE: 97.2 F

## 2022-08-18 DIAGNOSIS — M95.0: ICD-10-CM

## 2022-08-18 DIAGNOSIS — Z98.890 STATUS POST NASAL SEPTOPLASTY: ICD-10-CM

## 2022-08-18 DIAGNOSIS — J34.3 HYPERTROPHY OF BOTH INFERIOR NASAL TURBINATES: ICD-10-CM

## 2022-08-18 DIAGNOSIS — J34.2 DEVIATED NASAL SEPTUM: Primary | ICD-10-CM

## 2022-08-18 PROCEDURE — 99024 POSTOP FOLLOW-UP VISIT: CPT | Performed by: OTOLARYNGOLOGY

## 2022-08-18 ASSESSMENT — ENCOUNTER SYMPTOMS
TROUBLE SWALLOWING: 0
SORE THROAT: 0
COLOR CHANGE: 0
WHEEZING: 0
RHINORRHEA: 0
SHORTNESS OF BREATH: 0
APNEA: 0
COUGH: 0
DIARRHEA: 0
CHOKING: 0
VOMITING: 0
NAUSEA: 0
FACIAL SWELLING: 0
CHEST TIGHTNESS: 0
SINUS PRESSURE: 0
STRIDOR: 0
VOICE CHANGE: 0
ABDOMINAL PAIN: 0

## 2022-08-18 NOTE — OP NOTE
endotracheal  anesthesia had obtained, the patient's face was prepped and draped in  aseptic fashion. The nose decongested, vasoconstricted and anesthetized  in the usual manner for nasal surgery. Right inferior turbinate was  partially submucosally resected/ablated with a turbinator through an  anterior entry point, treating the medial surface throughout its length. Boies elevator was used to gently fracture the inferior turbinate  laterally and two cottonoids with Afrin were placed against the  turbinate. Septum was carefully anesthetized with 0.5% ropivacaine with epinephrine  1:100,000. A left hemitransfixion incision was created and the caudal  margin of quadrangular cartilage was carefully dissected out. There was  extensive scarring along with the fractures. Some of the mucosa was  very difficult to elevate because it was overlying a fracture line. Right anterior tunnel having been elevated, a portion of the  posterior-inferior aspect of quadrangular cartilage was resected  submucosally. This was saved as a potential graft which turned out not  to be needed. Posterior bony deflections removed submucosally. Attention was turned to the anterior quadrangular cartilage. The  perichondrium on the concave side was very carefully elevated to allow  the remaining cartilage to move straight. However, it was much too tall  and long. Inferior aspect of the quadrangular cartilage was trimmed 3-4  times before the correct tight was achieved. Caudal margin was trimmed  as well, twice with some resection of the mucosa on the posterior aspect  of the hemitransfixion incision to match. There was excess mucosa on  the convex circumference, and was expected. When the quadrangular cartilage was set within the septal envelope in  the midline without significant bowing, the inferior aspect of  quadrangular cartilage were anchored to midline using horizontal  mattress 3-0 chromic sutures.   Septal envelope

## 2022-08-18 NOTE — PROGRESS NOTES
[facility] [place] [facility]  [facility] EAR, NOSE AND THROAT  [facility]  Dept: [phone]  Dept Fax: [phone]  Loc: [address] [facility] KATHERINE Case is a 68 y.o. male who was referred byNo ref. provider found for:  Chief Complaint   Patient presents with    Post-Op Check     Patient is here for post op 8/17/22 rivka vu    . HPI:     Jefferson Chacon Case is a 68 y.o. male who presents today for post op 8/17/22. Septoplasty, partial submucous reduction of right inferior  Turbinate on 8/17/22. He used the Afrin. He feels good. He didn't need to use the pain pill. Used breathe right strips     History:     No Known Allergies  Current Outpatient Medications   Medication Sig Dispense Refill    famotidine (PEPCID) 20 MG tablet Take 1 tablet by mouth 2 times daily Not \"as needed\" and not for stomach acid. For side effects of the ACE inhibitor 60 tablet 0    diphenhydrAMINE (BENADRYL) 12.5 MG chewable tablet Take 12.5 mg by mouth nightly as needed for Allergies      ramipril (ALTACE) 2.5 MG capsule Take 2.5 mg by mouth daily      ipratropium (ATROVENT) 0.06 % nasal spray 1 spray by Nasal route 3 times daily Spray at bedtime and may repeat every 6 hours. No more than 3 doses a day. 15 mL 3    Probiotic Product (PROBIOTIC DAILY PO) Take 1 capsule by mouth daily      cetirizine (ZYRTEC) 10 MG tablet Take 10 mg by mouth daily      acetaminophen (TYLENOL) 500 MG tablet Take 500 mg by mouth every 6 hours as needed for Pain      amoxicillin-clavulanate (AUGMENTIN) 875-125 MG per tablet Take 1 tablet by mouth 2 times daily for 20 days 40 tablet 0     No current facility-administered medications for this visit.      Past Medical History:   Diagnosis Date    BPH (benign prostatic hyperplasia)     OA (osteoarthritis) of hip     left    Prostate cancer ([facility] [address] 01/01/2003    Dr. Seng Royal - s/p radiation beads/radiation/hormone therapy    Sinus bradycardia     HR 49 on preop EKG - no syncope or lightheadedness - he exercises regularly    Snoring     no known apnea, no daytime somnolence, no am headache, no waking coughing or choking, BMI 24.5, neck circ 15.5 inches      Past Surgical History:   Procedure Laterality Date    KNEE ARTHROSCOPY W/ MENISCAL REPAIR Left 2022    PROSTATE SURGERY  06/01/2003    Prostate CA seed implants    ROTATOR CUFF REPAIR Right 10/01/2012    SEPTOPLASTY  11/01/1977    SEPTOPLASTY Bilateral 8/17/2022    Septoplasty Submucosal Ablation of Bilateral Inferior Nasal Turbinates performed by Gera Bassett MD at [address]  01/01/1980    TONSILLECTOMY  01/01/1975     Family History   Problem Relation Age of Onset    Breast Cancer Mother     Heart Attack Father 80     Social History     Tobacco Use    Smoking status: Never    Smokeless tobacco: Former    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 HENT:  Negative for congestion, dental problem, ear discharge, ear pain, facial swelling, hearing loss, mouth sores, nosebleeds, postnasal drip, rhinorrhea, sinus pressure, sneezing, sore throat, tinnitus, trouble swallowing and voice change. Eyes:  Negative for visual disturbance. Respiratory:  Negative for apnea, cough, choking, chest tightness, shortness of breath, wheezing and stridor. Cardiovascular:  Negative for chest pain, palpitations and leg swelling. Gastrointestinal:  Negative for abdominal pain, diarrhea, nausea and vomiting. Endocrine: Negative for cold intolerance, heat intolerance, polydipsia and polyuria. Genitourinary:  Negative for dysuria, enuresis and hematuria.    Musculoskeletal:  Negative for arthralgias, gait problem, neck pain and neck stiffness. Skin:  Negative for color change and rash. Allergic/Immunologic: Negative for environmental allergies, food allergies and immunocompromised state. Neurological:  Negative for dizziness, syncope, facial asymmetry, speech difficulty, light-headedness and headaches. Hematological:  Negative for adenopathy. Does not bruise/bleed easily. Psychiatric/Behavioral:  Negative for confusion and sleep disturbance. The patient is not nervous/anxious. Objective:     /68 (Site: Left Upper Arm, Position: Sitting)   Pulse 63   Temp 97.2 °F (36.2 °C) (Infrared)   Resp 16   Ht 5' 11\" (1.803 m)   Wt 181 lb (82.1 kg)   SpO2 97%   BMI 25.24 kg/m²     Physical Exam    Nose: Nose is decongested and anesthetized with topical sprays. Nasal fossa is suctioned bilaterally. Clots are removed. Normal postoperative appearance. Data:  All of the past medical history, past surgical history, family history,social history, allergies and current medications were reviewed with the patient. Assessment & Plan   Diagnoses and all orders for this visit:     Diagnosis Orders   1. Deviated nasal septum, left anterior         2. Hypertrophy of both inferior nasal turbinates        3. Overhanging nasal tip        4. Status post nasal septoplasty            The findings were explained and his questions were answered. Follow the instructions from yesterday's AVS form regarding continuing the Afrin, and starting the saline irrigations in 3 days. Use NeilMed bottle, their packets, and only distilled water at least twice a day. Flush up one nostril and out the other, leaning forward over sink. Stop the Afrin after 4 more days. Follow up in 1 week. Tammie HOLM CMA ([facility]), am scribing for, and in the presence of Dr. Niko Kumari. Electronically signed by MARSHALL Vigil) on 8/18/22 at 3:28 PM EDT.      (Please note that portions of this note were completed with a voice recognition program. Efforts were made to edit the dictations butoccasionally words are mis-transcribed.)    I agree to the above documentation placed by my scribe. I have personally evaluated this patient. Additional findings are as noted. I reviewed the scribe's note and agree with the documented findings and plan of care. Any areas of disagreement are corrected. I agree with the chief complaint, past medical history, past surgical history, allergies, medications, social and family history as documented unless otherwise noted below.      Electronically signed by George Herron MD on 9/18/2022 at 3:30 PM

## 2022-08-25 ENCOUNTER — OFFICE VISIT (OUTPATIENT)
Dept: ENT CLINIC | Age: 77
End: 2022-08-25

## 2022-08-25 VITALS
BODY MASS INDEX: 24.9 KG/M2 | DIASTOLIC BLOOD PRESSURE: 68 MMHG | SYSTOLIC BLOOD PRESSURE: 130 MMHG | HEART RATE: 68 BPM | WEIGHT: 177.9 LBS | HEIGHT: 71 IN | TEMPERATURE: 98.4 F | RESPIRATION RATE: 16 BRPM | OXYGEN SATURATION: 98 %

## 2022-08-25 DIAGNOSIS — Z98.890 STATUS POST NASAL SEPTOPLASTY: ICD-10-CM

## 2022-08-25 DIAGNOSIS — L03.818 CELLULITIS OF OTHER SPECIFIED SITE: Primary | ICD-10-CM

## 2022-08-25 PROCEDURE — 99024 POSTOP FOLLOW-UP VISIT: CPT | Performed by: OTOLARYNGOLOGY

## 2022-08-25 ASSESSMENT — ENCOUNTER SYMPTOMS
DIARRHEA: 0
SHORTNESS OF BREATH: 0
WHEEZING: 0
RHINORRHEA: 0
SORE THROAT: 0
CHEST TIGHTNESS: 0
STRIDOR: 0
TROUBLE SWALLOWING: 0
VOMITING: 0
FACIAL SWELLING: 0
SINUS PRESSURE: 0
COUGH: 0
VOICE CHANGE: 0
COLOR CHANGE: 0
CHOKING: 0
APNEA: 0
NAUSEA: 0
ABDOMINAL PAIN: 0

## 2022-08-25 NOTE — PROGRESS NOTES
[facility] [place] [facility]  [facility] EAR, NOSE AND THROAT  [facility]  Dept: [phone]  Dept Fax: [phone]  Loc: [address] [facility] P Case is a 68 y.o. male who was referred byNo ref. provider found for:  Chief Complaint   Patient presents with    Post-Op Check     Patient is here for post op septo/bitr 08/17/22. He c/o of soreness in the nasal area and congestion. Cameron Hector HPI:     Adelaida [place] Case is a 68 y.o. male who presents today for follow-up on his nasal surgery from a week ago. He is still having discomfort. Cameron Hector History:     No Known Allergies  Current Outpatient Medications   Medication Sig Dispense Refill    famotidine (PEPCID) 20 MG tablet Take 1 tablet by mouth 2 times daily Not \"as needed\" and not for stomach acid. For side effects of the ACE inhibitor 60 tablet 0    diphenhydrAMINE (BENADRYL) 12.5 MG chewable tablet Take 12.5 mg by mouth nightly as needed for Allergies      ramipril (ALTACE) 2.5 MG capsule Take 2.5 mg by mouth daily      ipratropium (ATROVENT) 0.06 % nasal spray 1 spray by Nasal route 3 times daily Spray at bedtime and may repeat every 6 hours. No more than 3 doses a day. 15 mL 3    Probiotic Product (PROBIOTIC DAILY PO) Take 1 capsule by mouth daily      cetirizine (ZYRTEC) 10 MG tablet Take 10 mg by mouth daily      acetaminophen (TYLENOL) 500 MG tablet Take 500 mg by mouth every 6 hours as needed for Pain      amoxicillin-clavulanate (AUGMENTIN) 875-125 MG per tablet Take 1 tablet by mouth 2 times daily for 20 days 40 tablet 0     No current facility-administered medications for this visit.      Past Medical History:   Diagnosis Date    BPH (benign prostatic hyperplasia)     OA (osteoarthritis) of hip     left    Prostate cancer ([facility] [address] 01/01/2003    Dr. Fernando Montes - s/p radiation beads/radiation/hormone therapy    Sinus bradycardia     HR 49 on preop EKG - no syncope or lightheadedness - he exercises regularly Snoring     no known apnea, no daytime somnolence, no am headache, no waking coughing or choking, BMI 24.5, neck circ 15.5 inches      Past Surgical History:   Procedure Laterality Date    KNEE ARTHROSCOPY W/ MENISCAL REPAIR Left 2022    PROSTATE SURGERY  06/01/2003    Prostate CA seed implants    ROTATOR CUFF REPAIR Right 10/01/2012    SEPTOPLASTY  11/01/1977    SEPTOPLASTY Bilateral 8/17/2022    Septoplasty Submucosal Ablation of Bilateral Inferior Nasal Turbinates performed by Pedro Yuen MD at [address]  01/01/1980    TONSILLECTOMY  01/01/1975     Family History   Problem Relation Age of Onset    Breast Cancer Mother     Heart Attack Father 80     Social History     Tobacco Use    Smoking status: Never    Smokeless tobacco: Former    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 HENT:  Negative for congestion, dental problem, ear discharge, ear pain, facial swelling, hearing loss, mouth sores, nosebleeds, postnasal drip, rhinorrhea, sinus pressure, sneezing, sore throat, tinnitus, trouble swallowing and voice change. Eyes:  Negative for visual disturbance. Respiratory:  Negative for apnea, cough, choking, chest tightness, shortness of breath, wheezing and stridor. Cardiovascular:  Negative for chest pain, palpitations and leg swelling. Gastrointestinal:  Negative for abdominal pain, diarrhea, nausea and vomiting. Endocrine: Negative for cold intolerance, heat intolerance, polydipsia and polyuria. Genitourinary:  Negative for dysuria, enuresis and hematuria. Musculoskeletal:  Negative for arthralgias, gait problem, neck pain and neck stiffness.    Skin: Negative for color change and rash. Allergic/Immunologic: Negative for environmental allergies, food allergies and immunocompromised state. Neurological:  Negative for dizziness, syncope, facial asymmetry, speech difficulty, light-headedness and headaches. Hematological:  Negative for adenopathy. Does not bruise/bleed easily. Psychiatric/Behavioral:  Negative for confusion and sleep disturbance. The patient is not nervous/anxious. Objective:   /68 (Site: Left Upper Arm, Position: Sitting)   Pulse 68   Temp 98.4 °F (36.9 °C) (Infrared)   Resp 16   Ht 5' 11\" (1.803 m)   Wt 177 lb 14.4 oz (80.7 kg)   SpO2 98%   BMI 24.81 kg/m²     Physical Exam  Some erythema and purulent drainage noted anteriorly in the nasal fossa. This is cultured. Nose decongested and anesthetized with sprays. Both sides are suctioned. Good airway    Data:  All of the past medical history, past surgical history, family history,social history, allergies and current medications were reviewed with the patient. Assessment & Plan   Diagnoses and all orders for this visit:     Diagnosis Orders   1. Cellulitis of other specified site  Culture, Nasal      2. Status post nasal septoplasty  Culture, Nasal          The findings were explained and his questions were answered. We will use culture-directed antibiotic therapy. He will continue the clindamycin for now. Emphasized the importance of irrigating at least twice a day       [place]. Lorenza Nunez MD    **This report has been created using voice recognition software. It may contain minor errors which are inherent in voicerecognition technology. **

## 2022-08-26 LAB — GRAM STAIN RESULT: NORMAL

## 2022-08-27 LAB
AEROBIC CULTURE: ABNORMAL
AEROBIC CULTURE: ABNORMAL
ORGANISM: ABNORMAL

## 2022-09-08 ENCOUNTER — OFFICE VISIT (OUTPATIENT)
Dept: ENT CLINIC | Age: 77
End: 2022-09-08

## 2022-09-08 VITALS
BODY MASS INDEX: 24.86 KG/M2 | HEIGHT: 71 IN | TEMPERATURE: 98.3 F | HEART RATE: 43 BPM | SYSTOLIC BLOOD PRESSURE: 122 MMHG | OXYGEN SATURATION: 98 % | RESPIRATION RATE: 14 BRPM | WEIGHT: 177.6 LBS | DIASTOLIC BLOOD PRESSURE: 72 MMHG

## 2022-09-08 DIAGNOSIS — J34.2 DEVIATED NASAL SEPTUM: ICD-10-CM

## 2022-09-08 DIAGNOSIS — Z98.890 STATUS POST NASAL SEPTOPLASTY: Primary | ICD-10-CM

## 2022-09-08 DIAGNOSIS — J34.3 HYPERTROPHY OF BOTH INFERIOR NASAL TURBINATES: ICD-10-CM

## 2022-09-08 PROCEDURE — 99024 POSTOP FOLLOW-UP VISIT: CPT | Performed by: OTOLARYNGOLOGY

## 2022-09-08 NOTE — PROGRESS NOTES
[facility] [place] [facility]  [facility] EAR, NOSE AND THROAT  [facility]  Dept: [phone]  Dept Fax: [phone]  Loc: [address] [place] [address] Case is a 68 y.o. male who was referred byNo ref. provider found for:  Chief Complaint   Patient presents with    Post-Op Check     Patient is here for post op 8/17 septo/bitr   . HPI:     Dixie Umanzor Case is a 68 y.o. male who presents today for follow-up on his nasal airway surgery patient is very happy that he did the surgery. He is breathing much better and very pleased. History:     No Known Allergies  Current Outpatient Medications   Medication Sig Dispense Refill    famotidine (PEPCID) 20 MG tablet Take 1 tablet by mouth 2 times daily Not \"as needed\" and not for stomach acid. For side effects of the ACE inhibitor 60 tablet 0    diphenhydrAMINE (BENADRYL) 12.5 MG chewable tablet Take 12.5 mg by mouth nightly as needed for Allergies      ramipril (ALTACE) 2.5 MG capsule Take 2.5 mg by mouth daily      ipratropium (ATROVENT) 0.06 % nasal spray 1 spray by Nasal route 3 times daily Spray at bedtime and may repeat every 6 hours. No more than 3 doses a day. 15 mL 3    Probiotic Product (PROBIOTIC DAILY PO) Take 1 capsule by mouth daily      cetirizine (ZYRTEC) 10 MG tablet Take 10 mg by mouth daily      acetaminophen (TYLENOL) 500 MG tablet Take 500 mg by mouth every 6 hours as needed for Pain       No current facility-administered medications for this visit.      Past Medical History:   Diagnosis Date    BPH (benign prostatic hyperplasia)     OA (osteoarthritis) of hip     left    Prostate cancer ([facility] [address] 01/01/2003    Dr. Sheri Gray - s/p radiation beads/radiation/hormone therapy    Sinus bradycardia     HR 49 on preop EKG - no syncope or lightheadedness - he exercises regularly    Snoring     no known apnea, no daytime somnolence, no am headache, no waking coughing or choking, BMI 24.5, neck circ 15.5 inches Past Surgical History:   Procedure Laterality Date    KNEE ARTHROSCOPY W/ MENISCAL REPAIR Left 2022    PROSTATE SURGERY  06/01/2003    Prostate CA seed implants    ROTATOR CUFF REPAIR Right 10/01/2012    SEPTOPLASTY  11/01/1977    SEPTOPLASTY Bilateral 8/17/2022    Septoplasty Submucosal Ablation of Bilateral Inferior Nasal Turbinates performed by Shefali Davidson MD at [address]  01/01/1980    TONSILLECTOMY  01/01/1975     Family History   Problem Relation Age of Onset    Breast Cancer Mother     Heart Attack Father 80     Social History     Tobacco Use    Smoking status: Never    Smokeless tobacco: Former    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)   Substance Use Topics    Alcohol use: Yes     Alcohol/week: 2.0 standard drinks     Types: 2 Cans of beer per week     Comment: Socially       Subjective:       Review of Systems    Objective:     /72 (Site: Left Upper Arm, Position: Sitting)   Pulse (!) 43   Temp 98.3 °F (36.8 °C) (Infrared)   Resp 14   Ht 5' 11\" (1.803 m)   Wt 177 lb 9.6 oz (80.6 kg)   SpO2 98%   BMI 24.77 kg/m²     Physical Exam  Nose: Excellent airway. Normal healing. Infection improved    Data:  All of the past medical history, past surgical history, family history,social history, allergies and current medications were reviewed with the patient. Assessment & Plan   Diagnoses and all orders for this visit:     Diagnosis Orders   1. Status post nasal septoplasty        2. Deviated nasal septum, left anterior         3. Hypertrophy of both inferior nasal turbinates            The findings were explained and his questions were answered. Continue buffered hypertonic saline irrigations daily for two months. Call or return as needed. Dale HOLM CMA ([facility]), am scribing for, and in the presence of Dr. Shefali Davidson. Electronically signed by Nena Coello CMA ([facility]) on 9/8/22 at 12:04 PM EDT.      (Please note that portions of this note were completed with a voice recognition program. Efforts were made to edit the dictations butoccasionally words are mis-transcribed.)    I agree to the above documentation placed by my scribe. I have personally evaluated this patient. Additional findings are as noted. I reviewed the scribe's note and agree with the documented findings and plan of care. Any areas of disagreement are corrected. I agree with the chief complaint, past medical history, past surgical history, allergies, medications, social and family history as documented unless otherwise noted below.      Electronically signed by Gabriel Cade MD on 10/28/2022 at 5:48 PM

## 2022-09-11 RX ORDER — AMOXICILLIN AND CLAVULANATE POTASSIUM 875; 125 MG/1; MG/1
1 TABLET, FILM COATED ORAL 2 TIMES DAILY
Qty: 40 TABLET | Refills: 0 | Status: SHIPPED | OUTPATIENT
Start: 2022-09-11 | End: 2022-10-01

## 2022-12-19 ENCOUNTER — HOSPITAL ENCOUNTER (EMERGENCY)
Age: 77
Discharge: HOME | End: 2022-12-19
Payer: OTHER GOVERNMENT

## 2022-12-19 VITALS
BODY MASS INDEX: 24.5 KG/M2 | HEIGHT: 71 IN | OXYGEN SATURATION: 96 % | WEIGHT: 175 LBS | DIASTOLIC BLOOD PRESSURE: 56 MMHG | TEMPERATURE: 98.4 F | RESPIRATION RATE: 16 BRPM | HEART RATE: 65 BPM | SYSTOLIC BLOOD PRESSURE: 119 MMHG

## 2022-12-19 DIAGNOSIS — R11.2 NAUSEA AND VOMITING, UNSPECIFIED VOMITING TYPE: Primary | ICD-10-CM

## 2022-12-19 PROCEDURE — 99213 OFFICE O/P EST LOW 20 MIN: CPT

## 2022-12-19 PROCEDURE — 6370000000 HC RX 637 (ALT 250 FOR IP): Performed by: NURSE PRACTITIONER

## 2022-12-19 PROCEDURE — 99203 OFFICE O/P NEW LOW 30 MIN: CPT | Performed by: NURSE PRACTITIONER

## 2022-12-19 RX ORDER — ONDANSETRON 4 MG/1
4 TABLET, ORALLY DISINTEGRATING ORAL ONCE
Status: COMPLETED | OUTPATIENT
Start: 2022-12-19 | End: 2022-12-19

## 2022-12-19 RX ORDER — ONDANSETRON 4 MG/1
4 TABLET, FILM COATED ORAL DAILY PRN
Qty: 30 TABLET | Refills: 0 | Status: SHIPPED | OUTPATIENT
Start: 2022-12-19

## 2022-12-19 RX ADMIN — ONDANSETRON 4 MG: 4 TABLET, ORALLY DISINTEGRATING ORAL at 17:12

## 2022-12-19 ASSESSMENT — ENCOUNTER SYMPTOMS
NAUSEA: 1
COUGH: 0
CHEST TIGHTNESS: 0
RHINORRHEA: 0
DIARRHEA: 0
SORE THROAT: 0
SHORTNESS OF BREATH: 0
VOMITING: 1

## 2022-12-19 ASSESSMENT — PAIN - FUNCTIONAL ASSESSMENT: PAIN_FUNCTIONAL_ASSESSMENT: NONE - DENIES PAIN

## 2022-12-19 NOTE — ED PROVIDER NOTES
[facility]  Urgent Care Encounter       CHIEF COMPLAINT       Chief Complaint   Patient presents with    Emesis     Nausea, food poisoning from [place] per patient       Nurses Notes reviewed and I agree except as noted in the HPI. HISTORY OF PRESENT ILLNESS   Gertrude Wheatley is a 68 y.o. male who presents to the [address] urgent care for evaluation of emesis. He reports he ate at [employer] He reports that 2 people had chicken strips with no issues. He reports his granddaughter and himself had another kind of chicken. He reports that both of them had nausea and emesis. He denies abdominal pain or cramping. Denies diarrhea. Denies fever or chills. The history is provided by the patient. No  was used. REVIEW OF SYSTEMS     Review of Systems   Constitutional:  Negative for activity change, appetite change, chills, fatigue and fever. HENT:  Negative for ear discharge, ear pain, rhinorrhea and sore throat. Respiratory:  Negative for cough, chest tightness and shortness of breath. Cardiovascular:  Negative for chest pain. Gastrointestinal:  Positive for nausea and vomiting. Negative for diarrhea. Genitourinary:  Negative for dysuria. Skin:  Negative for rash. Allergic/Immunologic: Negative for environmental allergies and food allergies. Neurological:  Negative for dizziness and headaches.      PAST MEDICAL HISTORY         Diagnosis Date    BPH (benign prostatic hyperplasia)     OA (osteoarthritis) of hip     left    Prostate cancer ([facility] 01/01/2003    Dr. Geneva Chung - s/p radiation beads/radiation/hormone therapy    Sinus bradycardia     HR 49 on preop EKG - no syncope or lightheadedness - he exercises regularly    Snoring     no known apnea, no daytime somnolence, no am headache, no waking coughing or choking, BMI 24.5, neck circ 15.5 inches       SURGICALHISTORY     Patient  has a past surgical history that includes Rotator cuff repair (Right, 10/01/2012); Tonsillectomy (01/01/1975); sinus surgery (01/01/1980); Septoplasty (11/01/1977); Prostate surgery (06/01/2003); Knee arthroscopy w/ meniscal repair (Left, 2022); and Septoplasty (Bilateral, 8/17/2022). CURRENT MEDICATIONS       Discharge Medication List as of 12/19/2022  5:25 PM        CONTINUE these medications which have NOT CHANGED    Details   Tamsulosin HCl (FLOMAX PO) Take by mouthHistorical Med      famotidine (PEPCID) 20 MG tablet Take 1 tablet by mouth 2 times daily Not \"as needed\" and not for stomach acid. For side effects of the ACE inhibitor, Disp-60 tablet, R-0Normal      diphenhydrAMINE (BENADRYL) 12.5 MG chewable tablet Take 12.5 mg by mouth nightly as needed for AllergiesHistorical Med      ramipril (ALTACE) 2.5 MG capsule Take 2.5 mg by mouth dailyHistorical Med      ipratropium (ATROVENT) 0.06 % nasal spray 1 spray by Nasal route 3 times daily Spray at bedtime and may repeat every 6 hours. No more than 3 doses a day., Disp-15 mL, R-3Normal      Probiotic Product (PROBIOTIC DAILY PO) Take 1 capsule by mouth dailyHistorical Med      cetirizine (ZYRTEC) 10 MG tablet Take 10 mg by mouth dailyHistorical Med      acetaminophen (TYLENOL) 500 MG tablet Take 500 mg by mouth every 6 hours as needed for PainHistorical Med             ALLERGIES     Patient is has No Known Allergies. Patients   Immunization History   Administered Date(s) Administered    COVID-19, [employer], (age 18y+), IM, 0.5 mL 12/07/2021       FAMILY HISTORY     Patient's family history includes Breast Cancer in his mother; Heart Attack (age of onset: 80) in his father. SOCIAL HISTORY     Patient  reports that he has never smoked. He has quit using smokeless tobacco. He reports current alcohol use of about 2.0 standard drinks per week. He reports that he does not use drugs.     PHYSICAL EXAM     ED TRIAGE VITALS  BP: (!) 119/56, Temp: 98.4 °F (36.9 °C), Heart Rate: 65, Resp: 16, SpO2: 96 %,Estimated body mass index is 24.41 kg/m² as calculated from the following:    Height as of this encounter: 5' 11\" (1.803 m). Weight as of this encounter: 175 lb (79.4 kg). ,No LMP for male patient. Physical Exam  Vitals and nursing note reviewed. Constitutional:       General: He is not in acute distress. Appearance: Normal appearance. He is not ill-appearing, toxic-appearing or diaphoretic. HENT:      Head: Normocephalic. Right Ear: Ear canal and external ear normal.      Left Ear: Ear canal and external ear normal.      Nose: Nose normal. No congestion or rhinorrhea. Mouth/Throat:      Mouth: Mucous membranes are moist.      Pharynx: Oropharynx is clear. No oropharyngeal exudate or posterior oropharyngeal erythema. Cardiovascular:      Rate and Rhythm: Normal rate. Pulses: Normal pulses. Pulmonary:      Effort: Pulmonary effort is normal. No respiratory distress. Breath sounds: No stridor. No wheezing or rhonchi. Abdominal:      General: Abdomen is flat. Bowel sounds are normal.      Palpations: Abdomen is soft. Musculoskeletal:         General: No swelling or tenderness. Normal range of motion. Cervical back: Normal range of motion. Neurological:      General: No focal deficit present. Mental Status: He is alert and oriented to person, place, and time. Psychiatric:         Mood and Affect: Mood normal.         Behavior: Behavior normal.       DIAGNOSTIC RESULTS     Labs:No results found for this visit on 12/19/22. IMAGING:    No orders to display         EKG: None      URGENT CARE COURSE:     Vitals:    12/19/22 1703   BP: (!) 119/56   Pulse: 65   Resp: 16   Temp: 98.4 °F (36.9 °C)   TempSrc: Temporal   SpO2: 96%   Weight: 175 lb (79.4 kg)   Height: 5' 11\" (1.803 m)       Medications   ondansetron (ZOFRAN-ODT) disintegrating tablet 4 mg (4 mg Oral Given 12/19/22 1712)            PROCEDURES:  None    FINAL IMPRESSION      1.  Nausea and vomiting, unspecified vomiting type DISPOSITION/ PLAN     Patient seen and evaluated for emesis. Patient is provided a Zofran while at urgent care. He is provided a prescription for Zofran. Instructed use over-the-counter Tylenol and Motrin for pain or fever. Instructed to follow-up with his PCP in 3 to 5 days and worsening symptoms. Instructed presented emerged department for intolerance to oral fluids or severe abdominal pain. Patient is agreeable with the above plan and denies questions or concerns at this time.       PATIENT REFERRED TO:  Carmina Lamar MD  [address] [facility] [address] / [facility] [address]      DISCHARGE MEDICATIONS:  Discharge Medication List as of 12/19/2022  5:25 PM        START taking these medications    Details   ondansetron (ZOFRAN) 4 MG tablet Take 1 tablet by mouth daily as needed for Nausea or Vomiting, Disp-30 tablet, R-0Normal             Discharge Medication List as of 12/19/2022  5:25 PM          Discharge Medication List as of 12/19/2022  5:25 PM          NAE Márquez CNP    (Please note that portions of this note were completed with a voice recognition program. Efforts were made to edit the dictations but occasionally words are mis-transcribed.)           NAE Márquez CNP  12/19/22 3158

## 2022-12-19 NOTE — ED TRIAGE NOTES
Patient ambulated to room with cane and complaint of nausea and emesis. States several ate at [facility] yesterday and have had the same symptoms.

## 2024-01-03 ENCOUNTER — APPOINTMENT (OUTPATIENT)
Dept: GENERAL RADIOLOGY | Age: 79
End: 2024-01-03
Payer: OTHER GOVERNMENT

## 2024-01-03 ENCOUNTER — HOSPITAL ENCOUNTER (EMERGENCY)
Age: 79
Discharge: HOME | End: 2024-01-03
Attending: EMERGENCY MEDICINE
Payer: OTHER GOVERNMENT

## 2024-01-03 VITALS
WEIGHT: 178 LBS | DIASTOLIC BLOOD PRESSURE: 61 MMHG | HEART RATE: 71 BPM | OXYGEN SATURATION: 100 % | RESPIRATION RATE: 17 BRPM | TEMPERATURE: 99.2 F | BODY MASS INDEX: 25.48 KG/M2 | HEIGHT: 70 IN | SYSTOLIC BLOOD PRESSURE: 142 MMHG

## 2024-01-03 DIAGNOSIS — U07.1 COVID: Primary | ICD-10-CM

## 2024-01-03 LAB
ANION GAP SERPL CALC-SCNC: 13 MEQ/L (ref 8–16)
BASOPHILS ABSOLUTE: 0 THOU/MM3 (ref 0–0.1)
BASOPHILS NFR BLD AUTO: 0.4 %
BUN SERPL-MCNC: 13 MG/DL (ref 7–22)
CALCIUM SERPL-MCNC: 9.6 MG/DL (ref 8.5–10.5)
CHLORIDE SERPL-SCNC: 100 MEQ/L (ref 98–111)
CO2 SERPL-SCNC: 25 MEQ/L (ref 23–33)
CREAT SERPL-MCNC: 1.2 MG/DL (ref 0.4–1.2)
DEPRECATED RDW RBC AUTO: 43.3 FL (ref 35–45)
EKG ATRIAL RATE: 62 BPM
EKG P AXIS: 77 DEGREES
EKG P-R INTERVAL: 132 MS
EKG Q-T INTERVAL: 328 MS
EKG QRS DURATION: 76 MS
EKG QTC CALCULATION (BAZETT): 332 MS
EKG R AXIS: 46 DEGREES
EKG T AXIS: 39 DEGREES
EKG VENTRICULAR RATE: 62 BPM
EOSINOPHIL NFR BLD AUTO: 1 %
EOSINOPHILS ABSOLUTE: 0.1 THOU/MM3 (ref 0–0.4)
ERYTHROCYTE [DISTWIDTH] IN BLOOD BY AUTOMATED COUNT: 13 % (ref 11.5–14.5)
FLUAV RNA RESP QL NAA+PROBE: NOT DETECTED
FLUBV RNA RESP QL NAA+PROBE: NOT DETECTED
GFR SERPL CREATININE-BSD FRML MDRD: > 60 ML/MIN/1.73M2
GLUCOSE SERPL-MCNC: 115 MG/DL (ref 70–108)
HCT VFR BLD AUTO: 43.2 % (ref 42–52)
HGB BLD-MCNC: 13.9 GM/DL (ref 14–18)
IMM GRANULOCYTES # BLD AUTO: 0.04 THOU/MM3 (ref 0–0.07)
IMM GRANULOCYTES NFR BLD AUTO: 0.4 %
LIPASE SERPL-CCNC: 34.6 U/L (ref 5.6–51.3)
LYMPHOCYTES ABSOLUTE: 1 THOU/MM3 (ref 1–4.8)
LYMPHOCYTES NFR BLD AUTO: 10.6 %
MAGNESIUM SERPL-MCNC: 2.1 MG/DL (ref 1.6–2.4)
MCH RBC QN AUTO: 29.2 PG (ref 26–33)
MCHC RBC AUTO-ENTMCNC: 32.2 GM/DL (ref 32.2–35.5)
MCV RBC AUTO: 90.8 FL (ref 80–94)
MONOCYTES ABSOLUTE: 1.1 THOU/MM3 (ref 0.4–1.3)
MONOCYTES NFR BLD AUTO: 11.4 %
NEUTROPHILS NFR BLD AUTO: 76.2 %
NRBC BLD AUTO-RTO: 0 /100 WBC
OSMOLALITY SERPL CALC.SUM OF ELEC: 276.7 MOSMOL/KG (ref 275–300)
PLATELET # BLD AUTO: 172 THOU/MM3 (ref 130–400)
PMV BLD AUTO: 10.3 FL (ref 9.4–12.4)
POTASSIUM SERPL-SCNC: 4.3 MEQ/L (ref 3.5–5.2)
RBC # BLD AUTO: 4.76 MILL/MM3 (ref 4.7–6.1)
SARS-COV-2 RNA RESP QL NAA+PROBE: DETECTED
SEGMENTED NEUTROPHILS ABSOLUTE COUNT: 7.4 THOU/MM3 (ref 1.8–7.7)
SODIUM SERPL-SCNC: 138 MEQ/L (ref 135–145)
WBC # BLD AUTO: 9.7 THOU/MM3 (ref 4.8–10.8)

## 2024-01-03 PROCEDURE — 85025 COMPLETE CBC W/AUTO DIFF WBC: CPT

## 2024-01-03 PROCEDURE — 36415 COLL VENOUS BLD VENIPUNCTURE: CPT

## 2024-01-03 PROCEDURE — 83690 ASSAY OF LIPASE: CPT

## 2024-01-03 PROCEDURE — 2580000003 HC RX 258

## 2024-01-03 PROCEDURE — 71045 X-RAY EXAM CHEST 1 VIEW: CPT

## 2024-01-03 PROCEDURE — 87636 SARSCOV2 & INF A&B AMP PRB: CPT

## 2024-01-03 PROCEDURE — 99285 EMERGENCY DEPT VISIT HI MDM: CPT

## 2024-01-03 PROCEDURE — 6370000000 HC RX 637 (ALT 250 FOR IP)

## 2024-01-03 PROCEDURE — 96374 THER/PROPH/DIAG INJ IV PUSH: CPT

## 2024-01-03 PROCEDURE — 93010 ELECTROCARDIOGRAM REPORT: CPT | Performed by: INTERNAL MEDICINE

## 2024-01-03 PROCEDURE — 6360000002 HC RX W HCPCS

## 2024-01-03 PROCEDURE — 83735 ASSAY OF MAGNESIUM: CPT

## 2024-01-03 PROCEDURE — 93005 ELECTROCARDIOGRAM TRACING: CPT

## 2024-01-03 PROCEDURE — 80048 BASIC METABOLIC PNL TOTAL CA: CPT

## 2024-01-03 RX ORDER — ACETAMINOPHEN 500 MG
1000 TABLET ORAL
Status: COMPLETED | OUTPATIENT
Start: 2024-01-03 | End: 2024-01-03

## 2024-01-03 RX ORDER — 0.9 % SODIUM CHLORIDE 0.9 %
1000 INTRAVENOUS SOLUTION INTRAVENOUS ONCE
Status: COMPLETED | OUTPATIENT
Start: 2024-01-03 | End: 2024-01-03

## 2024-01-03 RX ORDER — ONDANSETRON 2 MG/ML
4 INJECTION INTRAMUSCULAR; INTRAVENOUS ONCE
Status: COMPLETED | OUTPATIENT
Start: 2024-01-03 | End: 2024-01-03

## 2024-01-03 RX ADMIN — SODIUM CHLORIDE 1000 ML: 9 INJECTION, SOLUTION INTRAVENOUS at 18:52

## 2024-01-03 RX ADMIN — ONDANSETRON 4 MG: 2 INJECTION INTRAMUSCULAR; INTRAVENOUS at 18:49

## 2024-01-03 RX ADMIN — ACETAMINOPHEN 1000 MG: 500 TABLET ORAL at 19:37

## 2024-01-03 ASSESSMENT — PAIN - FUNCTIONAL ASSESSMENT: PAIN_FUNCTIONAL_ASSESSMENT: NONE - DENIES PAIN

## 2024-01-03 NOTE — ED PROVIDER NOTES
[facility] EMERGENCY DEPT  EMERGENCY DEPARTMENT ENCOUNTER          Pt Name: Javier MURPHY Case  MRN: [id number]  Birthdate 1945  Date of evaluation: 1/3/2024  Physician: Wu Crisostomo MD  Supervising Attending Physician: Jimbo Medina DO       CHIEF COMPLAINT       Chief Complaint   Patient presents with    Emesis    Dizziness         HISTORY OF PRESENT ILLNESS    HPI  Javier MURPHY Case is a 78 y.o. male who presents to the emergency department from home, by private vehicle for evaluation of nasal congestion    Patient presents feeling unwell and nasal congestion for the past couple of days.  In addition he reported 2 bouts of vomiting today however he is not having any abdominal pain.  He reported some nausea.  Patient has been complaining of a cough productive of white sputum as well.  No fever however he reports few episodes of chills.    The patient has no other acute complaints at this time.      REVIEW OF SYSTEMS   Review of Systems      PAST MEDICAL AND SURGICAL HISTORY     Past Medical History:   Diagnosis Date    BPH (benign prostatic hyperplasia)     OA (osteoarthritis) of hip     left    Prostate cancer (HCC) 01/01/2003    Dr. Gonsalez - s/p radiation beads/radiation/hormone therapy    Sinus bradycardia     HR 49 on preop EKG - no syncope or lightheadedness - he exercises regularly    Snoring     no known apnea, no daytime somnolence, no am headache, no waking coughing or choking, BMI 24.5, neck circ 15.5 inches     Past Surgical History:   Procedure Laterality Date    KNEE ARTHROSCOPY W/ MENISCAL REPAIR Left 2022    PROSTATE SURGERY  06/01/2003    Prostate CA seed implants    ROTATOR CUFF REPAIR Right 10/01/2012    SEPTOPLASTY  11/01/1977    SEPTOPLASTY Bilateral 8/17/2022    Septoplasty Submucosal Ablation of Bilateral Inferior Nasal Turbinates performed by Cedrick Ceja MD at [facility] OR    SINUS SURGERY  01/01/1980    TONSILLECTOMY  01/01/1975         MEDICATIONS     Current

## 2024-01-03 NOTE — ED TRIAGE NOTES
Pt presents to ED with chief complaint of vomiting and dizziness. Pt states he started feeling ill yesterday. Reports he's been unable to keep anything down today.

## 2024-01-04 NOTE — DISCHARGE INSTRUCTIONS
Take Tylenol and Motrin as needed for fever or pain or fatigue    Return to the emergency department immediately if there is any new or concerning symptom.

## (undated) DEVICE — PACK-MAJOR

## (undated) DEVICE — TURBINATOR WAND: Brand: COBLATION

## (undated) DEVICE — SILICONE DUAL LUMEN STOMACH TUBE,ANTI-REFLUX VALVE AND RADIOPAQUE LINE: Brand: SALEM SUMP

## (undated) DEVICE — ENT PACK: Brand: MEDLINE INDUSTRIES, INC.

## (undated) DEVICE — PREP SOL PVP IODINE 4%  4 OZ/BTL

## (undated) DEVICE — SUTURE PLN GUT SZ 4-0 L18IN ABSRB YELLOWISH TAN L13MM SC-1 1828H

## (undated) DEVICE — ENTACT SEPTAL STAPLER 3 PACK: Brand: ENT SINUS